# Patient Record
Sex: FEMALE | Race: WHITE | NOT HISPANIC OR LATINO | Employment: UNEMPLOYED | URBAN - METROPOLITAN AREA
[De-identification: names, ages, dates, MRNs, and addresses within clinical notes are randomized per-mention and may not be internally consistent; named-entity substitution may affect disease eponyms.]

---

## 2017-01-16 ENCOUNTER — COMMUNICATION - HEALTHEAST (OUTPATIENT)
Dept: FAMILY MEDICINE | Facility: CLINIC | Age: 22
End: 2017-01-16

## 2017-01-16 DIAGNOSIS — F32.A DEPRESSION: ICD-10-CM

## 2017-01-30 ENCOUNTER — OFFICE VISIT - HEALTHEAST (OUTPATIENT)
Dept: FAMILY MEDICINE | Facility: CLINIC | Age: 22
End: 2017-01-30

## 2017-01-30 DIAGNOSIS — F32.5 MAJOR DEPRESSION IN FULL REMISSION (H): ICD-10-CM

## 2017-01-30 DIAGNOSIS — R39.15 URINARY URGENCY: ICD-10-CM

## 2017-01-30 ASSESSMENT — MIFFLIN-ST. JEOR: SCORE: 1932

## 2017-02-02 ENCOUNTER — COMMUNICATION - HEALTHEAST (OUTPATIENT)
Dept: FAMILY MEDICINE | Facility: CLINIC | Age: 22
End: 2017-02-02

## 2017-03-20 ENCOUNTER — COMMUNICATION - HEALTHEAST (OUTPATIENT)
Dept: FAMILY MEDICINE | Facility: CLINIC | Age: 22
End: 2017-03-20

## 2017-03-20 DIAGNOSIS — F32.5 MAJOR DEPRESSION IN FULL REMISSION (H): ICD-10-CM

## 2017-03-23 ENCOUNTER — COMMUNICATION - HEALTHEAST (OUTPATIENT)
Dept: FAMILY MEDICINE | Facility: CLINIC | Age: 22
End: 2017-03-23

## 2017-03-24 ENCOUNTER — OFFICE VISIT - HEALTHEAST (OUTPATIENT)
Dept: FAMILY MEDICINE | Facility: CLINIC | Age: 22
End: 2017-03-24

## 2017-03-24 DIAGNOSIS — Z71.84 TRAVEL ADVICE ENCOUNTER: ICD-10-CM

## 2017-03-28 ENCOUNTER — COMMUNICATION - HEALTHEAST (OUTPATIENT)
Dept: FAMILY MEDICINE | Facility: CLINIC | Age: 22
End: 2017-03-28

## 2017-03-30 ENCOUNTER — RECORDS - HEALTHEAST (OUTPATIENT)
Dept: ADMINISTRATIVE | Facility: OTHER | Age: 22
End: 2017-03-30

## 2017-04-01 ENCOUNTER — RECORDS - HEALTHEAST (OUTPATIENT)
Dept: ADMINISTRATIVE | Facility: OTHER | Age: 22
End: 2017-04-01

## 2017-06-22 ENCOUNTER — COMMUNICATION - HEALTHEAST (OUTPATIENT)
Dept: FAMILY MEDICINE | Facility: CLINIC | Age: 22
End: 2017-06-22

## 2017-06-22 DIAGNOSIS — F32.5 MAJOR DEPRESSION IN FULL REMISSION (H): ICD-10-CM

## 2017-06-25 ENCOUNTER — COMMUNICATION - HEALTHEAST (OUTPATIENT)
Dept: FAMILY MEDICINE | Facility: CLINIC | Age: 22
End: 2017-06-25

## 2018-03-13 ENCOUNTER — OFFICE VISIT - HEALTHEAST (OUTPATIENT)
Dept: FAMILY MEDICINE | Facility: CLINIC | Age: 23
End: 2018-03-13

## 2018-03-13 DIAGNOSIS — F33.9 MAJOR DEPRESSION, RECURRENT (H): ICD-10-CM

## 2018-03-13 DIAGNOSIS — F40.243 FEAR OF FLYING: ICD-10-CM

## 2018-03-27 ENCOUNTER — COMMUNICATION - HEALTHEAST (OUTPATIENT)
Dept: FAMILY MEDICINE | Facility: CLINIC | Age: 23
End: 2018-03-27

## 2018-03-29 ENCOUNTER — COMMUNICATION - HEALTHEAST (OUTPATIENT)
Dept: FAMILY MEDICINE | Facility: CLINIC | Age: 23
End: 2018-03-29

## 2018-06-25 ENCOUNTER — COMMUNICATION - HEALTHEAST (OUTPATIENT)
Dept: FAMILY MEDICINE | Facility: CLINIC | Age: 23
End: 2018-06-25

## 2018-06-25 DIAGNOSIS — F32.A DEPRESSION: ICD-10-CM

## 2018-09-13 ENCOUNTER — COMMUNICATION - HEALTHEAST (OUTPATIENT)
Dept: FAMILY MEDICINE | Facility: CLINIC | Age: 23
End: 2018-09-13

## 2019-03-27 ENCOUNTER — COMMUNICATION - HEALTHEAST (OUTPATIENT)
Dept: FAMILY MEDICINE | Facility: CLINIC | Age: 24
End: 2019-03-27

## 2019-03-27 DIAGNOSIS — F32.A DEPRESSION: ICD-10-CM

## 2019-05-23 ENCOUNTER — COMMUNICATION - HEALTHEAST (OUTPATIENT)
Dept: FAMILY MEDICINE | Facility: CLINIC | Age: 24
End: 2019-05-23

## 2019-05-23 DIAGNOSIS — F32.A DEPRESSION: ICD-10-CM

## 2019-08-30 ENCOUNTER — COMMUNICATION - HEALTHEAST (OUTPATIENT)
Dept: INTERNAL MEDICINE | Facility: CLINIC | Age: 24
End: 2019-08-30

## 2019-08-30 ENCOUNTER — OFFICE VISIT - HEALTHEAST (OUTPATIENT)
Dept: INTERNAL MEDICINE | Facility: CLINIC | Age: 24
End: 2019-08-30

## 2019-08-30 DIAGNOSIS — Z00.00 ROUTINE HEALTH MAINTENANCE: ICD-10-CM

## 2019-08-30 DIAGNOSIS — E66.09 CLASS 2 OBESITY DUE TO EXCESS CALORIES WITHOUT SERIOUS COMORBIDITY WITH BODY MASS INDEX (BMI) OF 37.0 TO 37.9 IN ADULT: ICD-10-CM

## 2019-08-30 DIAGNOSIS — E66.812 CLASS 2 OBESITY DUE TO EXCESS CALORIES WITHOUT SERIOUS COMORBIDITY WITH BODY MASS INDEX (BMI) OF 37.0 TO 37.9 IN ADULT: ICD-10-CM

## 2019-08-30 DIAGNOSIS — F41.9 ANXIETY: ICD-10-CM

## 2019-08-30 DIAGNOSIS — F32.A DEPRESSION, UNSPECIFIED DEPRESSION TYPE: ICD-10-CM

## 2019-08-30 LAB
ALBUMIN SERPL-MCNC: 4 G/DL (ref 3.5–5)
ALP SERPL-CCNC: 57 U/L (ref 45–120)
ALT SERPL W P-5'-P-CCNC: 19 U/L (ref 0–45)
ANION GAP SERPL CALCULATED.3IONS-SCNC: 10 MMOL/L (ref 5–18)
AST SERPL W P-5'-P-CCNC: 18 U/L (ref 0–40)
BASOPHILS # BLD AUTO: 0.1 THOU/UL (ref 0–0.2)
BASOPHILS NFR BLD AUTO: 1 % (ref 0–2)
BILIRUB SERPL-MCNC: 0.2 MG/DL (ref 0–1)
BUN SERPL-MCNC: 12 MG/DL (ref 8–22)
CALCIUM SERPL-MCNC: 9.6 MG/DL (ref 8.5–10.5)
CHLORIDE BLD-SCNC: 105 MMOL/L (ref 98–107)
CHOLEST SERPL-MCNC: 191 MG/DL
CO2 SERPL-SCNC: 25 MMOL/L (ref 22–31)
CREAT SERPL-MCNC: 0.9 MG/DL (ref 0.6–1.1)
EOSINOPHIL # BLD AUTO: 0.2 THOU/UL (ref 0–0.4)
EOSINOPHIL NFR BLD AUTO: 2 % (ref 0–6)
ERYTHROCYTE [DISTWIDTH] IN BLOOD BY AUTOMATED COUNT: 12 % (ref 11–14.5)
FASTING STATUS PATIENT QL REPORTED: YES
GFR SERPL CREATININE-BSD FRML MDRD: >60 ML/MIN/1.73M2
GLUCOSE BLD-MCNC: 102 MG/DL (ref 70–125)
HCT VFR BLD AUTO: 37 % (ref 35–47)
HDLC SERPL-MCNC: 47 MG/DL
HGB BLD-MCNC: 12.4 G/DL (ref 12–16)
LDLC SERPL CALC-MCNC: 109 MG/DL
LYMPHOCYTES # BLD AUTO: 3.4 THOU/UL (ref 0.8–4.4)
LYMPHOCYTES NFR BLD AUTO: 37 % (ref 20–40)
MCH RBC QN AUTO: 26.2 PG (ref 27–34)
MCHC RBC AUTO-ENTMCNC: 33.5 G/DL (ref 32–36)
MCV RBC AUTO: 78 FL (ref 80–100)
MONOCYTES # BLD AUTO: 0.7 THOU/UL (ref 0–0.9)
MONOCYTES NFR BLD AUTO: 8 % (ref 2–10)
NEUTROPHILS # BLD AUTO: 4.8 THOU/UL (ref 2–7.7)
NEUTROPHILS NFR BLD AUTO: 53 % (ref 50–70)
PLATELET # BLD AUTO: 278 THOU/UL (ref 140–440)
PMV BLD AUTO: 8.7 FL (ref 7–10)
POTASSIUM BLD-SCNC: 4.2 MMOL/L (ref 3.5–5)
PROT SERPL-MCNC: 7.8 G/DL (ref 6–8)
RBC # BLD AUTO: 4.72 MILL/UL (ref 3.8–5.4)
SODIUM SERPL-SCNC: 140 MMOL/L (ref 136–145)
TRIGL SERPL-MCNC: 177 MG/DL
TSH SERPL DL<=0.005 MIU/L-ACNC: 4.15 UIU/ML (ref 0.3–5)
WBC: 9.2 THOU/UL (ref 4–11)

## 2019-08-30 ASSESSMENT — MIFFLIN-ST. JEOR: SCORE: 1968.05

## 2019-09-24 ENCOUNTER — COMMUNICATION - HEALTHEAST (OUTPATIENT)
Dept: INTERNAL MEDICINE | Facility: CLINIC | Age: 24
End: 2019-09-24

## 2019-09-24 DIAGNOSIS — F32.A DEPRESSION, UNSPECIFIED DEPRESSION TYPE: ICD-10-CM

## 2019-09-24 DIAGNOSIS — F41.9 ANXIETY: ICD-10-CM

## 2020-12-14 ENCOUNTER — OFFICE VISIT - HEALTHEAST (OUTPATIENT)
Dept: FAMILY MEDICINE | Facility: CLINIC | Age: 25
End: 2020-12-14

## 2020-12-14 DIAGNOSIS — Z11.3 ROUTINE SCREENING FOR STI (SEXUALLY TRANSMITTED INFECTION): ICD-10-CM

## 2020-12-14 DIAGNOSIS — Z30.09 GENERAL COUNSELING FOR PRESCRIPTION OF ORAL CONTRACEPTIVES: ICD-10-CM

## 2020-12-14 DIAGNOSIS — Z00.00 ROUTINE GENERAL MEDICAL EXAMINATION AT A HEALTH CARE FACILITY: ICD-10-CM

## 2020-12-14 DIAGNOSIS — F41.9 ANXIETY: ICD-10-CM

## 2020-12-14 DIAGNOSIS — F32.1 MODERATE MAJOR DEPRESSION (H): ICD-10-CM

## 2020-12-14 DIAGNOSIS — Z12.4 SCREENING FOR CERVICAL CANCER: ICD-10-CM

## 2020-12-14 DIAGNOSIS — Z13.228 ENCOUNTER FOR SCREENING FOR OTHER METABOLIC DISORDERS: ICD-10-CM

## 2020-12-14 DIAGNOSIS — R12 HEARTBURN: ICD-10-CM

## 2020-12-14 LAB
FERRITIN SERPL-MCNC: 19 NG/ML (ref 10–130)
HGB BLD-MCNC: 13.5 G/DL (ref 12–16)
HIV 1+2 AB+HIV1 P24 AG SERPL QL IA: NEGATIVE
TSH SERPL DL<=0.005 MIU/L-ACNC: 1.5 UIU/ML (ref 0.3–5)

## 2020-12-14 ASSESSMENT — MIFFLIN-ST. JEOR: SCORE: 1867.7

## 2020-12-14 ASSESSMENT — PATIENT HEALTH QUESTIONNAIRE - PHQ9: SUM OF ALL RESPONSES TO PHQ QUESTIONS 1-9: 12

## 2020-12-15 ENCOUNTER — COMMUNICATION - HEALTHEAST (OUTPATIENT)
Dept: FAMILY MEDICINE | Facility: CLINIC | Age: 25
End: 2020-12-15

## 2020-12-15 DIAGNOSIS — F32.1 MODERATE MAJOR DEPRESSION (H): ICD-10-CM

## 2020-12-15 LAB
BKR LAB AP ABNORMAL BLEEDING: NO
BKR LAB AP BIRTH CONTROL/HORMONES: NORMAL
BKR LAB AP CERVICAL APPEARANCE: NORMAL
BKR LAB AP GYN ADEQUACY: NORMAL
BKR LAB AP GYN INTERPRETATION: NORMAL
BKR LAB AP HPV REFLEX: NORMAL
BKR LAB AP LMP: NORMAL
BKR LAB AP PATIENT STATUS: NORMAL
BKR LAB AP PREVIOUS ABNORMAL: NORMAL
BKR LAB AP PREVIOUS NORMAL: NORMAL
HCV AB SERPL QL IA: NEGATIVE
HIGH RISK?: NO
PATH REPORT.COMMENTS IMP SPEC: NORMAL
RESULT FLAG (HE HISTORICAL CONVERSION): NORMAL

## 2020-12-18 ENCOUNTER — COMMUNICATION - HEALTHEAST (OUTPATIENT)
Dept: FAMILY MEDICINE | Facility: CLINIC | Age: 25
End: 2020-12-18

## 2020-12-22 ENCOUNTER — COMMUNICATION - HEALTHEAST (OUTPATIENT)
Dept: INTERNAL MEDICINE | Facility: CLINIC | Age: 25
End: 2020-12-22

## 2020-12-22 DIAGNOSIS — F41.9 ANXIETY: ICD-10-CM

## 2020-12-22 DIAGNOSIS — F32.1 MODERATE MAJOR DEPRESSION (H): ICD-10-CM

## 2021-01-27 ENCOUNTER — COMMUNICATION - HEALTHEAST (OUTPATIENT)
Dept: FAMILY MEDICINE | Facility: CLINIC | Age: 26
End: 2021-01-27

## 2021-02-08 ENCOUNTER — COMMUNICATION - HEALTHEAST (OUTPATIENT)
Dept: FAMILY MEDICINE | Facility: CLINIC | Age: 26
End: 2021-02-08

## 2021-02-08 DIAGNOSIS — R12 HEARTBURN: ICD-10-CM

## 2021-02-09 ENCOUNTER — OFFICE VISIT - HEALTHEAST (OUTPATIENT)
Dept: FAMILY MEDICINE | Facility: CLINIC | Age: 26
End: 2021-02-09

## 2021-02-09 ENCOUNTER — COMMUNICATION - HEALTHEAST (OUTPATIENT)
Dept: FAMILY MEDICINE | Facility: CLINIC | Age: 26
End: 2021-02-09

## 2021-02-09 DIAGNOSIS — F32.1 MODERATE MAJOR DEPRESSION (H): ICD-10-CM

## 2021-02-09 DIAGNOSIS — R41.840 DISTURBED CONCENTRATION: ICD-10-CM

## 2021-02-09 DIAGNOSIS — R12 HEARTBURN: ICD-10-CM

## 2021-02-09 ASSESSMENT — PATIENT HEALTH QUESTIONNAIRE - PHQ9: SUM OF ALL RESPONSES TO PHQ QUESTIONS 1-9: 12

## 2021-03-03 ENCOUNTER — COMMUNICATION - HEALTHEAST (OUTPATIENT)
Dept: FAMILY MEDICINE | Facility: CLINIC | Age: 26
End: 2021-03-03

## 2021-03-15 ENCOUNTER — COMMUNICATION - HEALTHEAST (OUTPATIENT)
Dept: FAMILY MEDICINE | Facility: CLINIC | Age: 26
End: 2021-03-15

## 2021-03-15 DIAGNOSIS — F32.1 MODERATE MAJOR DEPRESSION (H): ICD-10-CM

## 2021-03-27 ENCOUNTER — COMMUNICATION - HEALTHEAST (OUTPATIENT)
Dept: SCHEDULING | Facility: CLINIC | Age: 26
End: 2021-03-27

## 2021-03-27 ENCOUNTER — OFFICE VISIT - HEALTHEAST (OUTPATIENT)
Dept: FAMILY MEDICINE | Facility: CLINIC | Age: 26
End: 2021-03-27

## 2021-03-27 DIAGNOSIS — N39.0 ACUTE UTI (URINARY TRACT INFECTION): ICD-10-CM

## 2021-04-29 ENCOUNTER — COMMUNICATION - HEALTHEAST (OUTPATIENT)
Dept: FAMILY MEDICINE | Facility: CLINIC | Age: 26
End: 2021-04-29

## 2021-04-29 ENCOUNTER — OFFICE VISIT - HEALTHEAST (OUTPATIENT)
Dept: FAMILY MEDICINE | Facility: CLINIC | Age: 26
End: 2021-04-29

## 2021-04-29 DIAGNOSIS — F90.0 ATTENTION DEFICIT HYPERACTIVITY DISORDER (ADHD), PREDOMINANTLY INATTENTIVE TYPE: ICD-10-CM

## 2021-04-29 DIAGNOSIS — R12 HEARTBURN: ICD-10-CM

## 2021-04-29 DIAGNOSIS — Z30.09 GENERAL COUNSELING FOR PRESCRIPTION OF ORAL CONTRACEPTIVES: ICD-10-CM

## 2021-04-29 DIAGNOSIS — F32.1 MODERATE MAJOR DEPRESSION (H): ICD-10-CM

## 2021-04-29 RX ORDER — DESOGESTREL AND ETHINYL ESTRADIOL 0.15-0.03
1 KIT ORAL DAILY
Qty: 84 TABLET | Refills: 3 | Status: SHIPPED | OUTPATIENT
Start: 2021-04-29 | End: 2022-04-07

## 2021-04-29 ASSESSMENT — PATIENT HEALTH QUESTIONNAIRE - PHQ9: SUM OF ALL RESPONSES TO PHQ QUESTIONS 1-9: 9

## 2021-05-07 ENCOUNTER — COMMUNICATION - HEALTHEAST (OUTPATIENT)
Dept: FAMILY MEDICINE | Facility: CLINIC | Age: 26
End: 2021-05-07

## 2021-05-07 DIAGNOSIS — F32.1 MODERATE MAJOR DEPRESSION (H): ICD-10-CM

## 2021-05-07 RX ORDER — ESCITALOPRAM OXALATE 20 MG/1
TABLET ORAL
Qty: 90 TABLET | Refills: 3 | Status: SHIPPED | OUTPATIENT
Start: 2021-05-07 | End: 2022-05-05

## 2021-05-07 RX ORDER — BUPROPION HYDROCHLORIDE 300 MG/1
TABLET ORAL
Qty: 90 TABLET | Refills: 3 | Status: SHIPPED | OUTPATIENT
Start: 2021-05-07 | End: 2022-05-05

## 2021-05-13 ENCOUNTER — COMMUNICATION - HEALTHEAST (OUTPATIENT)
Dept: ADMINISTRATIVE | Facility: CLINIC | Age: 26
End: 2021-05-13

## 2021-05-27 ASSESSMENT — PATIENT HEALTH QUESTIONNAIRE - PHQ9
SUM OF ALL RESPONSES TO PHQ QUESTIONS 1-9: 12
SUM OF ALL RESPONSES TO PHQ QUESTIONS 1-9: 12
SUM OF ALL RESPONSES TO PHQ QUESTIONS 1-9: 9

## 2021-05-27 NOTE — TELEPHONE ENCOUNTER
RN cannot approve Refill Request    RN can NOT refill this medication PCP messaged that patient is overdue for Office Visit.       Brandi De Dios, [facility] Triage/Med Refill 3/27/2019    Requested Prescriptions   Pending Prescriptions Disp Refills     buPROPion (WELLBUTRIN XL) 300 MG 24 hr tablet [Pharmacy Med Name: BUPROPION HCL  MG TABLET] 90 tablet 2     Sig: TAKE 1 TABLET (300 MG TOTAL) BY MOUTH DAILY.    Tricyclics/Misc Antidepressant/Antianxiety Meds Refill Protocol Failed - 3/27/2019 10:30 AM       Failed - PCP or prescribing provider visit in last year    Last office visit with prescriber/PCP: 3/13/2018 Arielle Francisco MD OR same dept: Visit date not found OR same specialty: 3/13/2018 Arielle Francisco MD  Last physical: Visit date not found Last MTM visit: Visit date not found   Next visit within 3 mo: Visit date not found  Next physical within 3 mo: Visit date not found  Prescriber OR PCP: Arielle Francisco MD  Last diagnosis associated with med order: 1. Depression  - buPROPion (WELLBUTRIN XL) 300 MG 24 hr tablet [Pharmacy Med Name: BUPROPION HCL  MG TABLET]; TAKE 1 TABLET (300 MG TOTAL) BY MOUTH DAILY.  Dispense: 90 tablet; Refill: 2    If protocol passes may refill for 12 months if within 3 months of last provider visit (or a total of 15 months).

## 2021-05-27 NOTE — TELEPHONE ENCOUNTER
Left message to call back for: Patient  Information to relay to patient:  Please let pt know that Dr. Francisco sent in a refill of her bupropion but she is due for an office visit before further refills.  Please assist in scheduling a med check for pt.  Thanks!

## 2021-05-30 VITALS — HEIGHT: 70 IN | WEIGHT: 248 LBS | BODY MASS INDEX: 35.5 KG/M2

## 2021-05-30 VITALS — WEIGHT: 248 LBS | BODY MASS INDEX: 35.79 KG/M2

## 2021-05-31 NOTE — PROGRESS NOTES
Office Visit - New Patient   Liliana Henley   24 y.o.  female    Date of visit: 8/30/2019  Physician: Jose Francisco Sibley MD     Assessment and Plan     1.  24-year-old woman establishing primary care at [facility] internal medicine.  Historically has been quite healthy.  Issues addressed today are anxiety, depression, obesity, mildly elevated blood pressure, and preventive health.    She is fasting this morning, so we will check metabolic panel, CBC, TSH, and lipid profile.  She declines HIV screening.  Will be referring her to gynecology for routine care.  Vaccinations appear to be up-to-date.    Going issue issue    2.  Anxiety and depression that dates back to her teenager years.  She has been on Wellbutrin at doses as high as 300 mg a day, which she found slightly helpful, but not sufficiently so.  She occasionally has more severe anxiety, and has history of at least one panic attack.  She gets anxiety when flying, for which she will use Xanax.  Alcohol consumption is practically.  She says that depression has been a bit worse in recent months.  Her PHQ 9 scores is 18 with significant positives on interest and pleasure, feeling depressed, overeating, and trouble concentrating.    She is interested in trying a different medication.  She never previously had any of the SSRIs.  I took bupropion off of her medication list.  We will try escitalopram (also known to the brand name Lexapro) at a starting dose of 10 mg daily.  She knows that it may take weeks to even a couple months to assess its effect.  Next dosage level would be 20 mg a day.  I would like to see her back in the clinic in about 6 to 8 weeks to assess effect, consider dose changes.  She will let me know if she has any severe fluctuations in mood, because that may mean that we need to stop Lexapro and try something else.    She feels that she is functioning reasonably well at work.  She does not does not feel like she is in any danger.  There do  not appear to be any problems with sleep apnea or other severe sleep disturbance.    3.  Obesity with BMI of 37.  She was a high school .  Graduate from college at about 200 pounds.  Now she is about 57 pounds above that.  Not getting regular exercise.  Overeating may be related to issue #2 above.    She is committed to starting an exercise program again, and being more careful with diet.  We will recheck a metabolic parameters.  There is no family history of diabetes.    4.  Mildly elevated blood pressure.  Today's level of 126/86 would be considered mildly elevated.  Would not start medication at this point, but rather focus on lifestyle improvement, especially weight loss.  I told her that  target normal blood pressure is 120/80, measured at rest.  I would suggest that she start monitoring her own blood pressures and keeping a log book.    5.  Other preventive care.  Referral entered for routine GYN care.  She reports normal periods.  Vaccinations appear up-to-date.  No close family history of breast cancer or colon cancer.         Chief Complaint   Chief Complaint   Patient presents with     Hypertension     BP check     Anxiety     Anxiety and depression worse lately, off all meds due to insurance        History of Present Illness   This 24 y.o. old comes for evaluation of anxiety and depression, would like to get back on medication, also has concerns about blood pressure and weight    Went off parent insurance this year    Anxiety  Depression in early 20's  Anxiety more recently, not situational.  Was on Wellbutrin-- ran out as of 1 month ago    No SSRI's yet  Would like to try  Sleeping is OK  No JHONATAN  Anxiety 3 months ago had a panic attack.  Depression worse past few months  Work as -- maybe a little less focus  Fear of flying - She has panic when she flies and takes xanax which works well for her.  Has never seen a  specialist.    Alcohol is practically zero  Single, not in  relationship  Lives in own apartment    Obesity  Wt Readings from Last 3 Encounters:   08/30/19 (!) 257 lb (116.6 kg)   03/13/18 (!) 253 lb 8 oz (115 kg)   03/24/17 (!) 248 lb (112.5 kg)   [place] in [place]  Graduated from college around 200  Not exercising these days  Desk job  Restaurant food    BP  BP Readings from Last 3 Encounters:   08/30/19 126/86   03/13/18 128/70   03/24/17 128/80       Immunization History   Administered Date(s) Administered     DT (pediatric) 05/17/2005     DTaP, historic 1993, 1993, 1993, 09/07/1994, 11/13/1997     HPV Quadrivalent 06/14/2007, 01/02/2008, 05/28/2008     Hep A, historic 07/29/2009, 11/01/2010     Hep B, historic 1993, 1993, 1993     HiB, historic,unspecified 1993, 1993, 1993, 05/31/1994     IPV 1993, 1993, 09/07/1994     Influenza, inj, historic,unspecified 02/12/2007, 01/02/2008, 11/01/2010     MMR 02/11/1994, 05/17/2005     Meningococcal MCV4P 06/14/2007     Td,adult,historic,unspecified 05/17/2005     Tdap 11/01/2010     Typhoid, Inj, Inactive 03/24/2017       Review of Systems symptom inventory: positives marked with (+)    Constitutional: No fevers, weight loss, or appetite disturbance  Eyes: No visual disturbance or loss of acuity, eye pain or irritation  Ears, nose, mouth, throat: No change in hearing, nasal congestion, oral or throat symptoms  Cardiovascular: No chest pain, palpitation, syncope or presyncope  Respiratory: No shortness of breath, cough, or wheeze  Gastrointestinal: No abdominal pain, nausea, vomiting, or bowel change/disturbance  Musculoskeletal: No joint pain or swelling, back pain or deformity, limitation of motion  Integumentary (skin/breast): No skin eruptions or concerning lesions  Neurological: No cognitive difficulties, problems with movement or coordination, gait or balance, numbness or loss of sensation  Psychiatric:   + symptoms of depression, anxiety, or thought  "disturbance  Endocrine: No involuntary weight gain or loss, heat or cold intolerance, polyuria or polydipsia  Hematologic/lymphatic: No abnormal bleeding/clotting/bruising, no swelling or tissue masses  Allergic/immunologic: No allergic symptoms or infections suggesting immunodeficiency     Review of Systems: A comprehensive review of systems was negative except as noted.     Medications and Allergies   Current Outpatient Medications   Medication Sig Dispense Refill     escitalopram oxalate (LEXAPRO) 10 MG tablet Take 1 tablet (10 mg total) by mouth daily. 30 tablet 2     No current facility-administered medications for this visit.      Allergies   Allergen Reactions     Luna Seed         Family and Social History   Family History   Problem Relation Age of Onset     No Medical Problems Mother      Hypertension Father      Aortic aneurysm Father      Hypertension Sister      No Medical Problems Brother         Social History     Tobacco Use     Smoking status: Never Smoker     Smokeless tobacco: Never Used   Substance Use Topics     Alcohol use: Yes     Alcohol/week: 0.0 - 0.6 oz     Drug use: No        Physical Exam   General Appearance: Very pleasant, significantly overweight.  Affect seems normal    /86 (Patient Site: Right Arm, Patient Position: Sitting, Cuff Size: Adult Large)   Pulse 78   Ht 5' 9.5\" (1.765 m)   Wt (!) 257 lb (116.6 kg)   SpO2 99%   BMI 37.41 kg/m      Positives marked with (+)  General: Alert, in no distress  Skin: No significant lesion seen.  Eyes/nose/throat: Eyes without scleral icterus, eye movements normal, pupils equal and reactive, oropharynx clear, ears with normal TM's  MSK: Neck with good ROM  Lymphatic: Neck without adenopathy or masses  Endocrine: Thyroid with no nodules to palpation  Pulm: Lungs clear to auscultation bilaterally  Cardiac: Heart with regular rate and rhythm, no murmur or gallop  GI: Abdomen soft, nontender. No palpable enlargement of liver or " spleen  MSK: Extremities no tenderness or edema  Neuro: Moves all extremities, without focal weakness  Psych: Alert mental status. Normal affect and speech        Additional Information        Jose Francisco Sibley MD  Internal Medicine

## 2021-05-31 NOTE — PATIENT INSTRUCTIONS - HE
1. 24-year-old woman establishing primary care at [facility] internal medicine.  Historically has been quite healthy.  Issues addressed today are anxiety, depression, obesity, mildly elevated blood pressure, and preventive health.    She is fasting this morning, so we will check metabolic panel, CBC, TSH, and lipid profile.  She declines HIV screening.  Will be referring her to gynecology for routine care.  Vaccinations appear to be up-to-date.    Going issue issue    2.  Anxiety and depression that dates back to her teenager years.  She has been on Wellbutrin at doses as high as 300 mg a day, which she found slightly helpful, but not sufficiently so.  She occasionally has more severe anxiety, and has history of at least one panic attack.  She gets anxiety when flying, for which she will use Xanax.  Alcohol consumption is practically.  She says that depression has been a bit worse in recent months.  Her PHQ 9 scores is 18 with significant positives on interest and pleasure, feeling depressed, overeating, and trouble concentrating.    She is interested in trying a different medication.  She never previously had any of the SSRIs.  I took bupropion off of her medication list.  We will try escitalopram (also known to the brand name Lexapro) at a starting dose of 10 mg daily.  She knows that it may take weeks to even a couple months to assess its effect.  Next dosage level would be 20 mg a day.  I would like to see her back in the clinic in about 6 to 8 weeks to assess effect, consider dose changes.  She will let me know if she has any severe fluctuations in mood, because that may mean that we need to stop Lexapro and try something else.    She feels that she is functioning reasonably well at work.  She does not does not feel like she is in any danger.  There do not appear to be any problems with sleep apnea or other severe sleep disturbance.    3.  Obesity with BMI of 37.  She was a high school basketball  player.  Graduate from college at about 200 pounds.  Now she is about 57 pounds above that.  Not getting regular exercise.  Overeating may be related to issue #2 above.    She is committed to starting an exercise program again, and being more careful with diet.  We will recheck a metabolic parameters.  There is no family history of diabetes.    4.  Mildly elevated blood pressure.  Today's level of 126/86 would be considered mildly elevated.  Would not start medication at this point, but rather focus on lifestyle improvement, especially weight loss.  I told her that  target normal blood pressure is 120/80, measured at rest.  I would suggest that she start monitoring her own blood pressures and keeping a log book.    5.  Other preventive care.  Referral entered for routine GYN care.  She reports normal periods.  Vaccinations appear up-to-date.  No close family history of breast cancer or colon cancer.

## 2021-06-01 VITALS — BODY MASS INDEX: 36.58 KG/M2 | WEIGHT: 253.5 LBS

## 2021-06-01 NOTE — TELEPHONE ENCOUNTER
Refill Approved    Rx renewed per Medication Renewal Policy. Medication was last renewed on 8/30/19.    Brandi De Dios, [facility] Triage/Med Refill 9/25/2019     Requested Prescriptions   Pending Prescriptions Disp Refills     escitalopram oxalate (LEXAPRO) 10 MG tablet 90 tablet 0     Sig: Take 1 tablet (10 mg total) by mouth daily.       SSRI Refill Protocol  Passed - 9/24/2019  3:07 PM        Passed - PCP or prescribing provider visit in last year     Last office visit with prescriber/PCP: 8/30/2019 Jose Francisco Sibley MD OR same dept: 8/30/2019 Jose Francisco Sibley MD OR same specialty: 8/30/2019 Jose Francisco Sibley MD  Last physical: Visit date not found Last MTM visit: Visit date not found   Next visit within 3 mo: Visit date not found  Next physical within 3 mo: Visit date not found  Prescriber OR PCP: Jose Francisco Sibley MD  Last diagnosis associated with med order: 1. Anxiety  - escitalopram oxalate (LEXAPRO) 10 MG tablet; Take 1 tablet (10 mg total) by mouth daily.  Dispense: 90 tablet; Refill: 0    2. Depression, unspecified depression type  - escitalopram oxalate (LEXAPRO) 10 MG tablet; Take 1 tablet (10 mg total) by mouth daily.  Dispense: 90 tablet; Refill: 0    If protocol passes may refill for 12 months if within 3 months of last provider visit (or a total of 15 months).

## 2021-06-03 VITALS — HEIGHT: 70 IN | WEIGHT: 257 LBS | BODY MASS INDEX: 36.79 KG/M2

## 2021-06-05 VITALS
HEIGHT: 70 IN | RESPIRATION RATE: 16 BRPM | WEIGHT: 234 LBS | BODY MASS INDEX: 33.5 KG/M2 | SYSTOLIC BLOOD PRESSURE: 120 MMHG | TEMPERATURE: 98.8 F | HEART RATE: 88 BPM | DIASTOLIC BLOOD PRESSURE: 78 MMHG

## 2021-06-07 ENCOUNTER — OFFICE VISIT - HEALTHEAST (OUTPATIENT)
Dept: FAMILY MEDICINE | Facility: CLINIC | Age: 26
End: 2021-06-07

## 2021-06-07 DIAGNOSIS — R12 HEARTBURN: ICD-10-CM

## 2021-06-07 DIAGNOSIS — F90.0 ATTENTION DEFICIT HYPERACTIVITY DISORDER (ADHD), PREDOMINANTLY INATTENTIVE TYPE: ICD-10-CM

## 2021-06-07 LAB
AMPHETAMINES UR QL SCN: NORMAL
BARBITURATES UR QL: NORMAL
BENZODIAZ UR QL: NORMAL
CANNABINOIDS UR QL SCN: NORMAL
COCAINE UR QL: NORMAL
CREAT UR-MCNC: 69 MG/DL
METHADONE UR QL SCN: NORMAL
OPIATES UR QL SCN: NORMAL
OXYCODONE UR QL: NORMAL
PCP UR QL SCN: NORMAL

## 2021-06-07 RX ORDER — DEXTROAMPHETAMINE SACCHARATE, AMPHETAMINE ASPARTATE MONOHYDRATE, DEXTROAMPHETAMINE SULFATE AND AMPHETAMINE SULFATE 2.5; 2.5; 2.5; 2.5 MG/1; MG/1; MG/1; MG/1
CAPSULE, EXTENDED RELEASE ORAL
Qty: 90 CAPSULE | Refills: 0 | Status: SHIPPED | OUTPATIENT
Start: 2021-06-07 | End: 2021-09-02

## 2021-06-07 RX ORDER — DEXTROAMPHETAMINE SACCHARATE, AMPHETAMINE ASPARTATE MONOHYDRATE, DEXTROAMPHETAMINE SULFATE AND AMPHETAMINE SULFATE 7.5; 7.5; 7.5; 7.5 MG/1; MG/1; MG/1; MG/1
30 CAPSULE, EXTENDED RELEASE ORAL DAILY
Qty: 90 CAPSULE | Refills: 0 | Status: SHIPPED | OUTPATIENT
Start: 2021-06-07 | End: 2021-09-02

## 2021-06-07 ASSESSMENT — PATIENT HEALTH QUESTIONNAIRE - PHQ9: SUM OF ALL RESPONSES TO PHQ QUESTIONS 1-9: 4

## 2021-06-07 ASSESSMENT — MIFFLIN-ST. JEOR: SCORE: 1753.84

## 2021-06-08 NOTE — PROGRESS NOTES
"SUBJECTIVE: Liliana Henley is a 22 y.o. female with:  Chief Complaint   Patient presents with     Depression     pt stop Depo - August 2016     Medication Management     wellbutrin    1) Depression - She is taking Wellbutrin 300 mg daily in the am.  No problems with sleep.  She switched jobs. 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  She likes her job much better.  She recently broke up with long term boyfriend and feels she handled the situation much better than break-ups in the past  She also feels the Wellbutrin helps with her concentration.  She is not taking any supplements.  Wonders if she can wean off medicine.    2) Increased urination - She has some urgency but no incontinence.  She drinks coffee- 8 oz in am and then another cup during day.  No artificial sweeteners.  She drinks water otherwise.  She had normal BMP/ urine analysis at visit in July.  She thinks the urinary issues coincided with increasing dose Wellbutrin.    3) She stopped Depo in August and has not got her period yet.  She has not been sexually active.    OBJECTIVE:   Visit Vitals     /60     Pulse 60     Ht 5' 9.8\" (1.773 m)     Wt (!) 248 lb (112.5 kg)     Breastfeeding No     BMI 35.79 kg/m2    no distress  Psych Exam:  Mood: upbeat and pleasant  Affect: normal   Behavior: appropriate  ThoughtContent: logical  Judgement: appropriate  Insight: normal    PHQ9: 2    Liliana was seen today for depression and medication management.    Diagnoses and all orders for this visit:    Major depression in full remission - We can try weaning but recommend she consider taking supplements/ work on exercise and diet to support her neurotransmitter production.  -     buPROPion (WELLBUTRIN XL) 150 MG 24 hr tablet; Take 1 tablet (150 mg total) by mouth daily.  -     Vitamin D, Total (25-Hydroxy)    Urinary urgency- I think this is side effect from Wellbutrin.     Cut down on Wellbutrin to 150 mg daily then update me by " My Chart message in 2 weeks.  I would continue for 8 weeks then stop or rx 75 mg for a few more weeks.    ProThera VitaPrime Iron-free multivitamins with minerals 1 twice daily    Marine Fish oil - one capsule daily      Arielle Francisco

## 2021-06-09 NOTE — PROGRESS NOTES
Assessment:      No contraindications to travel.    Flight anxiety        Plan:      Issues discussed: insect-borne illnesses, malaria, traveler's diarrhea and flight anxiety. Will use Xanax for this. We discussed how to use this .  Immunizations recommended: Typhoid (parenteral).She is UTD on Hep A and Hep B  Malaria prophylaxis: malarone, daily dose starting 1-2 days before entering endemic area, ending 7 days after leaving area  Traveler's diarrhea prophylaxis: ciprofloxacin.  Total duration of visit: 15 Minutes. Total time spent on education, counseling, coordination of care: 15 Minutes.     Subjective:      Liliana Henley is a 22 y.o. female who presents for travel consultation.  Planned departure date: 4-5-17           Planned return date: 4-17-17  Countries of travel: [facility]  Areas in country: [place]    Accommodations: private home  Purpose of travel: vacation  Prior travel out of [place]: yes  Currently ill / Fever: no  History of liver or kidney disease: no    Data Review:   CDC web site reviewed for recommended immunizations and cautions  The following portions of the patient's history were reviewed and updated as appropriate: allergies, current medications, past family history, past medical history, past social history, past surgical history and problem list.    Review of Systems  Behavioral/Psych: positive for anxiety related to flight       Objective:      No exam performed today, NA.

## 2021-06-11 ENCOUNTER — OFFICE VISIT - HEALTHEAST (OUTPATIENT)
Dept: FAMILY MEDICINE | Facility: CLINIC | Age: 26
End: 2021-06-11

## 2021-06-11 DIAGNOSIS — R30.0 DIFFICULT OR PAINFUL URINATION: ICD-10-CM

## 2021-06-13 NOTE — PROGRESS NOTES
FEMALE ADULT PREVENTIVE EXAM    CHIEF COMPLAINT:  Female preventive exam.    SUBJECTIVE:  Liliana Henley is a 25 y.o. female who presents for her routine physical exam.    Patient would like to address the following concerns today:   1) Interested in birth control. She started dating and has a new partner.  Using condoms now.  Used Depo Provera in past but would like to use OCP.  No smoking/ migraines or h/o clotting disorder.  2) Depression - Mood has been down.  Having trouble focusing at work.  Taking citalopram 10 mg daily and her anxiety is better.  In the past she used Wellbutrin but stopped as did not address her anxiety.    3) She has been taking omeprazole for her heartburn.  Taking the med on regular basis.        GYNE HISTORY  Menses: regular  Sexually Active: yes  Contraception: condoms  Last Pap: 2013 normal  Abnormal Pap: no        She  has no past medical history on file.    Lab Results   Component Value Date    WBC 9.2 08/30/2019    HGB 12.4 08/30/2019    HCT 37.0 08/30/2019    MCV 78 (L) 08/30/2019     08/30/2019     08/30/2019    K 4.2 08/30/2019    BUN 12 08/30/2019     Lab Results   Component Value Date    CHOL 191 08/30/2019    HDL 47 (L) 08/30/2019    LDLCALC 109 08/30/2019    TRIG 177 (H) 08/30/2019     Lab Results   Component Value Date    TSH 4.15 08/30/2019     BP Readings from Last 3 Encounters:   12/14/20 120/78   08/30/19 126/86   03/13/18 128/70       Surgeries:  No past surgical history on file.    Family History:  Her family history includes Aortic aneurysm in her father; Hypertension in her father and sister; No Medical Problems in her brother and mother.    Social History:  She  reports that she has never smoked. She has never used smokeless tobacco. She reports current alcohol use. She reports that she does not use drugs. Single.  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      Medications:    Current Outpatient Medications:      escitalopram oxalate  (LEXAPRO) 10 MG tablet, Take 1 tablet (10 mg total) by mouth daily., Disp: 90 tablet, Rfl: 3  HELD MEDICATIONS: None.    Allergies:  No latex allergies.  Allergies   Allergen Reactions     Sunflower Seed             RISK BEHAVIOR & HEALTH HABITS  Regular Exercise: yes.  Balanced diet: yes.  Seat Belt Use: yes  Calcium/Vitamin D: no.    Dental Care: YES    REVIEW OF SYSTEMS:  Complete head to toe review of systems is otherwise negative except as above.    OBJECTIVE:  VITAL SIGNS:    Vitals:    12/14/20 1124   BP: 120/78   Pulse: 88   Resp: 16   Temp: 98.8  F (37.1  C)     GENERAL:  Patient alert, in no acute distress.  EYES: PERRLA. Extraoccular movements intact, pupils equal, reactive to light and accommodation.  Normal conjunctiva and lids.    ENT:  Hearing grossly normal.  Normal appearance to ears and nose.  Bilateral TM s, external canals, oropharynx normal. Normal lips, gums and teeth.    NECK:  Supple, without thyromegaly or mass.  RESP:  Clear to auscultation without crackles, wheezes or distress.  Normal respiratory effort.   CV:  Regular rate and rhythm without murmurs, rubs or gallops.  Normal pedal pulses.  No varicosities or edema.  ABDOMEN:  Soft, non-tender, without hepatosplenomegaly, masses, or hernias.   BREASTS:  Nontender, without masses, nipple discharge, erythema, or axillary adenopathy.    :    External genitalia:  Normal without lesions.  Urethra: Normal appearing  Vagina: Normal without discharge  Cervix: Pink.  No CMT.  Uterus:  Nontender, mobile, without masses  Ovaries: Normal without masses  LYMPHATIC: Normal palpation of neck.  No lymphadenopathy.  No bruising.  NEURO:  CN II-XII intact, motor & sensory function all intact.  DTR and reflexes normal.  PSYCHIATRIC:  Alert & oriented with normal mood and affect.  Good judgment and insight.  SKIN:  Normal inspection and palpation.  MUSCULOSKELETAL: Normal gait and station.  - Spine / Ribs / Pelvis: Normal inspection, ROM, stability and  strength: Spine, Head, Neck, Upper and Lower Extremities.    PHQ-9 Total Score: 12 (12/14/2020 11:27 AM)        Liliana was seen today for annual exam, depression, anxiety, heartburn and medication refill.    Diagnoses and all orders for this visit:    Routine general medical examination at a health care facility  -     Td, Preservative Free (green label)  -     Influenza, Seasonal Quad, PF =/> 6months      General counseling for prescription of oral contraceptives- Discussed Sunday start.  Use back-up for first month.  -     desogestreL-ethinyl estradioL (APRI) 0.15-0.03 mg per tablet; Take 1 tablet by mouth daily.    Moderate major depression (H)- Will add in Wellbutrin. F/U with virtual visit in 1 month.  -     buPROPion (WELLBUTRIN XL) 150 MG 24 hr tablet; Take 1 tablet (150 mg total) by mouth every morning.  -     escitalopram oxalate (LEXAPRO) 10 MG tablet; Take 1 tablet (10 mg total) by mouth daily.    Anxiety  -     escitalopram oxalate (LEXAPRO) 10 MG tablet; Take 1 tablet (10 mg total) by mouth daily    Screening for cervical cancer  -     Gynecologic Cytology (PAP Smear)    Heartburn- Switch to H2 blocker.  -     famotidine (PEPCID) 40 MG tablet; Take 1 tablet (40 mg total) by mouth every evening.    Encounter for screening for other metabolic disorders  -     Thyroid Cascade  -     Ferritin  -     Hemoglobin    Routine screening for STI (sexually transmitted infection)  -     HIV Antigen/Antibody Screening Cascade  -     Hepatitis C Antibody (Anti-HCV)           Arielle Francisco

## 2021-06-15 NOTE — PATIENT INSTRUCTIONS - HE
1) Continue on Wellbutrin  mg daily.    2) We will increase Lexapro to 20 mg daily.    3) Consider Gene Sight pharmacogenetic testing in future.    4) I will make a referral for neuropsychiatric testing to evaluate for ADHD.    5) Consider getting a full spectrum light for morning use.    6) Update me in 2 weeks with a My Chart Message.    7) Continue on the omeprazole for now but let me know if having any breakthrough heartburn.

## 2021-06-15 NOTE — PROGRESS NOTES
"Liliana Henley is a 26 y.o. female who is being evaluated via a billable video visit.      How would you like to obtain your AVS? MyChart.  If dropped from the video visit, the video invitation should be resent by: Send to e-mail at: [email or web address]  Will anyone else be joining your video visit? No      Video Start Time: 12:54 PM    Assessment & Plan     Liliana was seen today for follow-up.    Diagnoses and all orders for this visit:    Moderate major depression (H)  -     buPROPion (WELLBUTRIN XL) 300 MG 24 hr tablet; Take 1 tablet (300 mg total) by mouth every morning.  -     escitalopram oxalate (LEXAPRO) 20 MG tablet; Take 1 tablet (20 mg total) by mouth daily.    Heartburn    Disturbed concentration  -     AMB REFERRAL TO MENTAL HEALTH AND ADDICTION  - Adult (18+); Assessment and Testing; Neuropsychological Testing; [facility] [phone]; We will contact you to schedule the appointment or please call with any questions      Patient Instructions   1) Continue on Wellbutrin  mg daily.    2) We will increase Lexapro to 20 mg daily.    3) Consider [facility] S[facility] pharmacogenetic testing in future.    4) I will make a referral for neuropsychiatric testing to evaluate for ADHD.    5) Consider getting a full spectrum light for morning use.    6) Update me in 2 weeks with a My Chart Message.    7) Continue on the omeprazole for now but let me know if having any breakthrough heartburn.     Will work on weaning omeprazole once her mood has improved.    Review of external notes as documented in note  28 minutes spent on the date of the encounter doing chart review and patient visit          BMI:   Estimated body mass index is 33.82 kg/m  as calculated from the following:    Height as of 12/14/20: 5' 9.75\" (1.772 m).    Weight as of 12/14/20: 234 lb (106.1 kg).       Return in about 2 weeks (around 2/23/2021) for Send me My Chart Message.    Arielle Francisco MD  [facility] " "[facility]   Liliana Henley is 26 y.o. and presents today for the following health issues   HPI   Chief Complaint   Patient presents with     Follow-up     Depression med      She is here to f/u on depression.  She has been on Lexapro 10 mg and Wellbutrin 150 mg daily.  She was having less motivation so went up on the Wellbutrin to 300 mg daily on her own 2 weeks ago.  Initially thought it helped her concentrate but now she is having trouble with focusing on her work.  No side effects on increased dose of Wellbutrin.  She feels Lexapro has helped her anxiety and now having more issues with depressed mood.  She has decreased energy/ feels \"blah\" / lack of motivation / trouble with concentration and focus.  No suicidal ideation.  Contributing factors: Pandemic/ decreased light / has moved twice in last month.  She has been doing some walking and weights at home. Feels she is eating well.  Taking vitamin D and iron supplements.    She tried to wean off omeprazole - switched to famotidine 40 mg daily but her heartburn came back at end of day.  She went back on omeprazole 20 mg daily and heartburn is completely gone.  Eating a healthy diet.  Has some coffee in am but does not seem to bother her.    SH: Single.  Works from home.  Currently living in boyfriend's apartment - they plan to get a place together when his lease is up.  Patient Active Problem List   Diagnosis     Heartburn     Depression     Anxiety     Moderate major depression (H)        Review of Systems  As above      Objective       Vitals:  No vitals were obtained today due to virtual visit.    Physical Exam  GENERAL: Healthy, alert and no distress  EYES: Eyes grossly normal to inspection. No discharge or erythema, or obvious scleral/conjunctival abnormalities.  RESP: No audible wheeze, cough, or visible cyanosis.  No visible retractions or increased work of breathing.    NEURO: Cranial nerves grossly intact. Mentation and speech " appropriate for age.  PSYCH: Mentation appears normal, affect normal/bright, judgement and insight intact, normal speech and appearance well-groomed    PHQ-9 Total Score: 12 (2/9/2021 12:00 PM)            Video-Visit Details    Type of service:  Video Visit    Video End Time (time video stopped): 1:23 PM  Originating Location (pt. Location): Home    Distant Location (provider location):  [facility]     Platform used for Video Visit: [employer]

## 2021-06-15 NOTE — TELEPHONE ENCOUNTER
Refill Approved    Rx renewed per Medication Renewal Policy. Medication was last renewed on 12/14/20.    Jacoby Sandoval, [facility] Triage/Med Refill 2/8/2021     Requested Prescriptions   Pending Prescriptions Disp Refills     famotidine (PEPCID) 40 MG tablet [Pharmacy Med Name: FAMOTIDINE 40MG TABLETS] 30 tablet 1     Sig: TAKE 1 TABLET(40 MG) BY MOUTH EVERY EVENING       GI Medications Refill Protocol Passed - 2/8/2021  3:56 AM        Passed - PCP or prescribing provider visit in last 12 or next 3 months.     Last office visit with prescriber/PCP: 3/13/2018 Arielle Francisco MD OR same dept: Visit date not found OR same specialty: 3/13/2018 Arielle Francisco MD  Last physical: 12/14/2020 Last MTM visit: Visit date not found   Next visit within 3 mo: Visit date not found  Next physical within 3 mo: Visit date not found  Prescriber OR PCP: Arielle Francisco MD  Last diagnosis associated with med order: 1. Heartburn  - famotidine (PEPCID) 40 MG tablet [Pharmacy Med Name: FAMOTIDINE 40MG TABLETS]; TAKE 1 TABLET(40 MG) BY MOUTH EVERY EVENING  Dispense: 30 tablet; Refill: 1    If protocol passes may refill for 12 months if within 3 months of last provider visit (or a total of 15 months).

## 2021-06-15 NOTE — TELEPHONE ENCOUNTER
Spoke with pt over the phone to go over PHQ9 but was not having good connection on her phone. PHQ9 questions send to pt via my-chart to complete.     Last PHQ9 on 2/9/21 scored of 12 not at goal.

## 2021-06-16 PROBLEM — F32.1 MODERATE MAJOR DEPRESSION (H): Status: ACTIVE | Noted: 2020-12-14

## 2021-06-16 PROBLEM — F41.9 ANXIETY: Status: ACTIVE | Noted: 2019-08-30

## 2021-06-16 NOTE — PROGRESS NOTES
SUBJECTIVE: Liliana Henley is a 23 y.o. female with:  Chief Complaint   Patient presents with     Follow-up     f/u med check      Medication Refill     xnax and wellbutrin refills     Depression- She stopped Wellbutrin this June and was off it all summer and fall.  She was doing ok off the medicine.  She lost a cousin to cystic fibrosis this winter and mood has been down since then.  She has been spending a lot of time with her family.  Her work has been suffering due to depressed mood.  She restarted Wellbutrin  mg about 6 weeks ago but still feels depressed.  No suicidal ideation. She has been trying to work on a healthier lifestyle. She felt she was eating more poorly this winter.  No trouble sleeping.  No weight loss on the medication.      Fear of flying - She has panic when she flies and takes xanax which works well for her.  She is going to [facility] at end of March and would like a refill.  She denies any excess sedation or dizziness with the medication.    SH: Lives in house with roommates.  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  Diet- getting more balanced.  Exercise - 30 min every other day.  Alcohol- 1 glass wine / week.  Supplements: Vitamin D / MVI  ROS: Periods irregular after stopping Depo.  She is sexually active and using condoms.      OBJECTIVE: /70 (Patient Site: Right Arm, Patient Position: Sitting, Cuff Size: Adult Large)  Pulse 85  Resp 16  Wt (!) 253 lb 8 oz (115 kg)  SpO2 98%  Breastfeeding? No  BMI 36.58 kg/m2 no distress.  Psych Exam:  Mood: depressed  Affect: normal   Behavior: appropriate  ThoughtContent: logical  Judgement: appropriate  Insight: normal     Vitamin D, Total (25-Hydroxy)   Date Value Ref Range Status   01/30/2017 24.2 (L) 30.0 - 80.0 ng/mL Final      Wt Readings from Last 3 Encounters:   03/13/18 (!) 253 lb 8 oz (115 kg)   03/24/17 (!) 248 lb (112.5 kg)   01/30/17 (!) 248 lb (112.5 kg)       Liliana was seen today for follow-up and  medication refill.    Diagnoses and all orders for this visit:    Major depression, recurrent - Still symptomatic. Will increase Wellbutrin to 300 mg daily.  -     buPROPion (WELLBUTRIN XL) 150 MG 24 hr tablet; Take 2 po in am    Fear of flying- Avoid driving while taking med.  -     ALPRAZolam (XANAX) 0.5 MG tablet; Take 1 tab 30 min before flight - may repeat in 30 min prn       Patient Instructions   Start on Wellbutrin 150 mg - 2 in am.    Take vitamin D3 3000 IU daily.    Follow-up in 1 month for routine physical / pap and to recheck mood.    Arielle Francisco

## 2021-06-16 NOTE — PATIENT INSTRUCTIONS - HE
Dear Liliana Henley    After reviewing your responses, I've been able to diagnose you with a urinary tract infection, which is a common infection of the bladder with bacteria.  This is not a sexually transmitted infection, though urinating immediately after intercourse can help prevent infections.  Drinking lots of fluids is also helpful to clear your current infection and prevent the next one.      I have sent a prescription for antibiotics to your pharmacy to treat this infection.    It is important that you take all of your prescribed medication even if your symptoms are improving after a few doses.  Taking all of your medicine helps prevent the symptoms from returning.     If your symptoms worsen, you develop pain in your back or stomach, develop fevers, or are not improving in 5 days, please contact your primary care provider for an appointment or visit any of our convenient Walk-in or Urgent Care Centers to be seen, which can be found on our website here.    Thanks again for choosing us as your health care partner,    Marleny Mayers MD

## 2021-06-16 NOTE — TELEPHONE ENCOUNTER
Triage Call:    -Patient was prescribed antibiotic for UTI related to E-visit.   -Pharmacy where prescription was sent is closed, patient would like prescription sent to a pharmacy that is open until 10 pm.   -Dr. Marleny Mayers prescribed the following below:    nitrofurantoin, macrocrystal-monohydrate, (MACROBID) 100 MG capsule 10 capsule 0 3/27/2021 4/1/2021 --   Sig - Route: Take 1 capsule (100 mg total) by mouth 2 (two) times a day for 5 days. - Oral   Sent to pharmacy as: nitrofurantoin monohydrate/macrocrystals 100 mg capsule (Macrobid)   E-Prescribing Status: Receipt confirmed by pharmacy (3/27/2021  3:35 PM CDT)   Acute UTI (urinary tract infection) [N39.0]       Called new pharmacy per patient preference at [phone] to give verbal order for the following above. Gave verbal order to Pharmacist Matt prescription that was sent in by Dr. Mayers. Pharmacist verbalized understanding and agrees with plan. RN called patient back to inform her she can  prescription in the next 30 minutes. Patient verbalized understanding and agrees with plan.     Kathleen Mahmood RN, BSN Nurse Triage Advisor 7:44 PM 3/27/2021     Additional Information    Caller has medication question only, adult not sick, and triager answers question    Protocols used: MEDICATION QUESTION CALL-A-

## 2021-06-17 NOTE — TELEPHONE ENCOUNTER
Spoke with pt she was given 10mg #30 on 4/29 and with dr instruction was to increase as needed. Once increased to 30 mg.  rx was sent to pharm for 30 mg tabs.  Pt states she is out of the 10 mg so not able to increase to 40 mg. As instructed on 5/12 to do so.  Pt states would like to use 30 mg daily and 10 mg prn.  Pharmacy called informed of the increase and date.  Advised to please fill rx for 10 mg.  Spoke with Susan at pharmacy.  Insurance will not pay for 10 mg until 5/26 so she is welcome to pay cash if needed prior.

## 2021-06-17 NOTE — TELEPHONE ENCOUNTER
Pt picked up 10 mg #30 on 4/29 to take 1 daily with 30 mg  30mg #30 on 5/10 to take 1 per day with the 10 mg    If she is taking 40 mg daily she should have enough until 4/29 of both.  Confirmed with pharmacy.  She should not be out.

## 2021-06-17 NOTE — PATIENT INSTRUCTIONS - HE
1) Start on Adderall extended release 10 mg in the am.    2) Update me in 1 week with My Chart message,    3) Consider books by Dr. Eamon Reynolds - Driven to Distraction.    4) Consider meditation to help with focus.    5) Continue omeprazole for now.  We will revisit a taper of the medicine in future.

## 2021-06-17 NOTE — PROGRESS NOTES
"Liliana Henley is a 26 y.o. female who is being evaluated via a billable video visit.      How would you like to obtain your AVS? MyChart.  If dropped from the video visit, the video invitation should be resent by: Send to e-mail at: [email or web address]  Will anyone else be joining your video visit? No    Video Start Time: 12:15 pm    Assessment & Plan     Liliana was seen today for adhd and medication refill.    Diagnoses and all orders for this visit:    Attention deficit hyperactivity disorder (ADHD), predominantly inattentive type  -     dextroamphetamine-amphetamine (ADDERALL XR) 10 MG 24 hr capsule; Take 1 capsule (10 mg total) by mouth daily.  Once her dose is set, will need to come in to sign controlled substance contract and f/u every 6 months.    Heartburn    Moderate major depression (H)  -     escitalopram oxalate (LEXAPRO) 20 MG tablet; Take 1 tablet (20 mg total) by mouth daily.  -     buPROPion (WELLBUTRIN XL) 300 MG 24 hr tablet; Take 1 tablet (300 mg total) by mouth every morning.    General counseling for prescription of oral contraceptives  -     desogestreL-ethinyl estradioL (APRI) 0.15-0.03 mg per tablet; Take 1 tablet by mouth daily.      Patient Instructions   1) Start on Adderall extended release 10 mg in the am.    2) Update me in 1 week with My Chart message,    3) Consider books by Dr. Eamon Reynolds - Driven to Distraction.    4) Consider meditation to help with focus.    5) Continue omeprazole for now.  We will revisit a taper of the medicine in future.         Review of external notes as documented in note  25 minutes spent on the date of the encounter doing chart review, history and exam, documentation and further activities per the note  [id number]}     BMI:   Estimated body mass index is 33.82 kg/m  as calculated from the following:    Height as of 12/14/20: 5' 9.75\" (1.772 m).    Weight as of 12/14/20: 234 lb (106.1 kg).       No follow-ups on file.    Arielle Francisco MD  M " [facility]    Evin Henley is 26 y.o. and presents today for the following health issues   HPI 1) Depression - She went up on Lexapro to 20 mg daily.  Continues on Wellbutrin 300 mg daily.  No side effects on meds.  She feels the change in dose has helped.  Mood is up/down but not as bad as before.  Would like to continue current meds.    2) ADD- She has had trouble with concentration since childhood.  Did see psychologist then and dx with ADD.  Took stimulant, she thinks Adderall, in past and it helped.  She went back to office and thought it would help her concentration but it did not. She would like to retry a stimulant.    3) GERD - She tried to wean off omeprazole by taking famotidine 40 mg but was still having breath through heartburn so restarted OTC Prilosec 20 mg daily.  It completely takes care of her heartburn.    4) She is taking OCPs for contraception.  No side effects on the pill.  Her BP is normal.  Up to date with last pap.  She is sexually active.    Patient Active Problem List   Diagnosis     Heartburn     Depression     Anxiety     Moderate major depression (H)        Review of Systems  As above      Objective       Vitals:  No vitals were obtained today due to virtual visit.    Physical Exam  GENERAL: Healthy, alert and no distress  EYES: Eyes grossly normal to inspection. No discharge or erythema, or obvious scleral/conjunctival abnormalities.  RESP: No audible wheeze, cough, or visible cyanosis.  No visible retractions or increased work of breathing.    NEURO: Cranial nerves grossly intact. Mentation and speech appropriate for age.  PSYCH: Mentation appears normal, affect normal/bright, judgement and insight intact, normal speech and appearance well-groomed    PHQ-9 Total Score: 9 (4/29/2021 11:00 AM)   No data recorded            Video-Visit Details    Type of service:  Video Visit    Video End Time (time video stopped): 12:38 pm  Originating Location (pt.  Location): Home    Distant Location (provider location):  [facility]     Platform used for Video Visit: Ayden Francisco

## 2021-06-17 NOTE — TELEPHONE ENCOUNTER
I noted the increase of her dose already on the 10 mg rx so I am not sure what else to do.  Pt can continue on the 30 mg and I can send in rx for 40 mg on 5/25/21.  Please advise her.

## 2021-06-17 NOTE — TELEPHONE ENCOUNTER
Refill Approved    Rx renewed per Medication Renewal Policy. Medication was last renewed on 4/29/21.    Ira Mendoza, [facility] Triage/Med Refill 5/7/2021     Requested Prescriptions   Pending Prescriptions Disp Refills     buPROPion (WELLBUTRIN XL) 300 MG 24 hr tablet [Pharmacy Med Name: BUPROPION XL 300MG TABLETS] 30 tablet 11     Sig: TAKE 1 TABLET(300 MG) BY MOUTH EVERY MORNING       Tricyclics/Misc Antidepressant/Antianxiety Meds Refill Protocol Passed - 5/7/2021 11:31 AM        Passed - PCP or prescribing provider visit in last year     Last office visit with prescriber/PCP: 3/13/2018 Arielle Francisco MD OR same dept: Visit date not found OR same specialty: 3/13/2018 Arielle Francisco MD  Last physical: 12/14/2020 Last MTM visit: Visit date not found   Next visit within 3 mo: Visit date not found  Next physical within 3 mo: Visit date not found  Prescriber OR PCP: Arielle Francisco MD  Last diagnosis associated with med order: 1. Moderate major depression (H)  - buPROPion (WELLBUTRIN XL) 300 MG 24 hr tablet [Pharmacy Med Name: BUPROPION XL 300MG TABLETS]; TAKE 1 TABLET(300 MG) BY MOUTH EVERY MORNING  Dispense: 30 tablet; Refill: 11  - escitalopram oxalate (LEXAPRO) 20 MG tablet [Pharmacy Med Name: ESCITALOPRAM 20MG TABLETS]; TAKE 1 TABLET(20 MG) BY MOUTH DAILY  Dispense: 30 tablet; Refill: 11    If protocol passes may refill for 12 months if within 3 months of last provider visit (or a total of 15 months).                escitalopram oxalate (LEXAPRO) 20 MG tablet [Pharmacy Med Name: ESCITALOPRAM 20MG TABLETS] 30 tablet 11     Sig: TAKE 1 TABLET(20 MG) BY MOUTH DAILY       SSRI Refill Protocol  Passed - 5/7/2021 11:31 AM        Passed - PCP or prescribing provider visit in last year     Last office visit with prescriber/PCP: 3/13/2018 Arielle Francisco MD OR same dept: Visit date not found OR same specialty: 3/13/2018 Arielle Francisco MD  Last physical:  12/14/2020 Last MTM visit: Visit date not found   Next visit within 3 mo: Visit date not found  Next physical within 3 mo: Visit date not found  Prescriber OR PCP: Arielle Francisco MD  Last diagnosis associated with med order: 1. Moderate major depression (H)  - buPROPion (WELLBUTRIN XL) 300 MG 24 hr tablet [Pharmacy Med Name: BUPROPION XL 300MG TABLETS]; TAKE 1 TABLET(300 MG) BY MOUTH EVERY MORNING  Dispense: 30 tablet; Refill: 11  - escitalopram oxalate (LEXAPRO) 20 MG tablet [Pharmacy Med Name: ESCITALOPRAM 20MG TABLETS]; TAKE 1 TABLET(20 MG) BY MOUTH DAILY  Dispense: 30 tablet; Refill: 11    If protocol passes may refill for 12 months if within 3 months of last provider visit (or a total of 15 months).

## 2021-06-17 NOTE — TELEPHONE ENCOUNTER
Reason for Call:  Other prescription     Detailed comments: Currently it is too soon to refill dextroamphetamine-amphetamine (ADDERALL XR) 10 MG 24 hr capsule. Pt go the last refill on 4/29/21 for a dispense of 30 tablets. She was taking 1 tablet daily with her 30 mg. Pt is not eligible for a refill per insurance until 5/25/21.     Pt stated that you increased her 10 mg and that is why she is out. If this is true a new RX would be needed for this increase. Can try to send a RX for 5 mg, 2 tablets to see if pt's insurance will pay if there was not an increase.     Pt would be paying $61.93 out of pocket due to being an early fill for the same dosage sent on 4/29/21.    Phone Number Patient can be reached at: Other phone number:  [facility], \[facility] [place] [facility]\"", [phone]    Best Time:     Can we leave a detailed message on this number?: Yes    Call taken on 5/13/2021 at 3:01 PM by Erin Pineda

## 2021-06-21 NOTE — LETTER
Letter by Arielle Francisco MD at      Author: Arielle Francisco MD Service: -- Author Type: --    Filed:  Encounter Date: 12/14/2020 Status: (Other)                    My Depression Action Plan  Name: Liliana Henley   Date of Birth 1995  Date: 12/14/2020    My Doctor: Arielle Francisco MD   My Clinic: [address] [address]  [phone]          GREEN    ZONE   Good Control    What it looks like:     Things are going generally well. You have normal ups and downs. You may even feel depressed from time to time, but bad moods usually last less than a day.   What you need to do:  1. Continue to care for yourself (see self care plan)  2. Check your depression survival kit and update it as needed  3. Follow your physicians recommendations including any medication.  4. Do not stop taking medication unless you consult with your physician first.           YELLOW         ZONE Getting Worse    What it looks like:     Depression is starting to interfere with your life.     It may be hard to get out of bed; you may be starting to isolate yourself from others.    Symptoms of depression are starting to last most all day and this has happened for several days.     You may have suicidal thoughts but they are not constant.   What you need to do:     1. Call your care team. Your response to treatment will improve if you keep your care team informed of your progress. Yellow periods are signs an adjustment may need to be made.     2. Continue your self-care.  Just get dressed and ready for the day.  Don't give yourself time to talk yourself out of it.    3. Talk to someone in your support network.    4. Open up your depression Depression Self-Care Plan / Wellness kit.           RED    ZONE Medical Alert - Get Help    What it looks like:     Depression is seriously interfering with your life.     You may experience these or other symptoms: You  cant get out of bed most days, cant work or engage in other necessary activities, you have trouble taking care of basic hygiene, or basic responsibilities, thoughts of suicide or death that will not go away, self-injurious behavior.     What you need to do:  1. Call your care team and request a same-day appointment. If they are not available (weekends or after hours) call your local crisis line, emergency room or 911.            Self-Care Plan / Wellness Kit    Self-Care for Depression  Heres the deal. Your body and mind are really not as separate as most people think.  What you do and think affects how you feel and how you feel influences what you do and think. This means if you do things that people who feel good do, it will help you feel better.  Sometimes this is all it takes.  There is also a place for medication and therapy depending on how severe your depression is, so be sure to consult with your medical provider and/ or Behavioral Health Consultant if your symptoms are worsening or not improving.     In order to better manage my stress, I will:    Exercise  Get some form of exercise, every day. This will help reduce pain and release endorphins, the feel good chemicals in your brain. This is almost as good as taking antidepressants!  This is not the same as joining a gym and then never going! (they count on that by the way?) It can be as simple as just going for a walk or doing some gardening, anything that will get you moving.      Hygiene   Maintain good hygiene (get out of bed in the morning, make your bed, brush your teeth, take a shower, and get dressed like you were going to work, even if you are unemployed).  If your clothes don't fit try to get ones that do.    Diet  Strive to eat foods that are good for me, drink plenty of water, and avoid excessive sugar, caffeine, alcohol, and other mood-altering substances.  Some foods that are helpful in depression are: complex carbohydrates, B vitamins,  flaxseed, fish or fish oil, fresh fruits and vegetables.    Psychotherapy  Agree to participate in Individual Therapy (if recommended).    Medication  If prescribed medications, I agree to take them.  Missing doses can result in serious side effects.  I understand that drinking alcohol, or other illicit drug use, may cause potential side effects.  I will not stop my medication abruptly without first discussing it with my provider.    Staying Connected With Others  Stay in touch with my friends, family members, and my primary care provider/team.    Use your imagination  Be creative.  We all have a creative side; it doesnt matter if its oil painting, sand castles, or mud pies! This will also kick up the endorphins.    Witness Beauty  (AKA stop and smell the roses) Take a look outside, even in mid-winter. Notice colors, textures. Watch the squirrels and birds.     Service to others  Be of service to others.  There is always someone else in need.  By helping others we can get out of ourselves and remember the really important things.  This also provides opportunities for practicing all the other parts of the program.    Humor  Laugh and be silly!  Adjust your TV habits for less news and crime-drama and more comedy.    Control your stress  Try breathing deep, massage therapy, biofeedback, and meditation. Find time to relax each day.     Crisis Text Line  http://www.crisistextline.org    The Crisis Text Line serves anyone, in any type of crisis, providing access to free, 24/7 support and information via the medium people already use and trust:    Here's how it works:  1.  Text 863-548 from anywhere in the [place], anytime, about any type of crisis.  2.  A live, trained Crisis Counselor receives the text and responds quickly.  3.  The volunteer Crisis Counselor will help you move from a 'hot moment to a cool moment'.  My support system    Clinic Contact:  Phone number:    Contact 1:  Phone number:    Contact 2:  Phone number:     Jew/:  Phone number:    Therapist:  Phone number:    [facility] crisis center:    Phone number:    Other community support:  Phone number:

## 2021-06-26 ENCOUNTER — HEALTH MAINTENANCE LETTER (OUTPATIENT)
Age: 26
End: 2021-06-26

## 2021-06-26 NOTE — PATIENT INSTRUCTIONS - HE
Dear Liliana Henley,    We are sorry you are not feeling well. Based on the responses you provided, it is recommended that you be seen in-person in urgent care so we can better evaluate your symptoms. Since these infections keep happening it is a good idea to have this urine tested and cultured to make sure you are getting the right treatment. Please click Here to find the nearest urgent care location to you.   You will not be charged for this Visit. Thank you for trusting us with your care.    Herbert Leggett PA-C

## 2021-06-26 NOTE — PROGRESS NOTES
"Liliana was seen today for follow-up and medication refill.    Diagnoses and all orders for this visit:    Attention deficit hyperactivity disorder (ADHD), predominantly inattentive type- Doing well on stimulant.  Controlled substance contract signed.  F/U in 6 months.  -     dextroamphetamine-amphetamine (ADDERALL XR) 30 MG 24 hr capsule; Take 1 capsule (30 mg total) by mouth daily.  -     dextroamphetamine-amphetamine (ADDERALL XR) 10 MG 24 hr capsule; Take 1 po in am with 30 mg tablet for total dose of 40 mg  -     Drug Abuse 1+, Urine    Heartburn- Improved with better nutrition.  F/U if needing to take PPI on more regular basis.     SUBJECTIVE: Liliana Henley is a 26 y.o. female with:  Chief Complaint   Patient presents with     Follow-up     Med check     Medication Refill    1) ADD- She recently started on stimulant medicine.  Has been titrating the dose up.  Taking Adderall XR 40 mg in am.  Feels helps with concentration for work.  Has not taken med on weekends.  No side effects.  Sleep and appetite are ok.  No increased anxiety. She recently moved and has new apartment with lots of light so enjoys working from home.    2) Depression - Mood doing well on current meds.    3) GERD - She has not needed Prilosec daily.  She did [employer] and digestion is much improved.  Only has needed Prilosec occasionally.    4) Doing well on OCPs.    OBJECTIVE: /70 (Patient Site: Left Arm, Patient Position: Sitting, Cuff Size: Adult Regular)   Pulse 71   Ht 5' 9.75\" (1.772 m)   Wt 208 lb 14.4 oz (94.8 kg)   SpO2 98%   BMI 30.19 kg/m   no distress  PSYCH:  Awake, alert, and oriented x 3.  Mood and affect are appropriate.  Good eye contact.  Speech is normal.  No suicidal ideation.  No hallucinations.    PHQ-9 Total Score: 4 (6/7/2021  9:00 AM)    Arielle Francisco   "

## 2021-07-04 NOTE — ADDENDUM NOTE
Addendum Note by Arielle Gupta MD at 5/12/2021  2:13 PM     Author: Arielle Gupta MD Service: -- Author Type: Physician    Filed: 5/12/2021  2:13 PM Encounter Date: 4/29/2021 Status: Signed    : Arielle Gupta MD (Physician)    Addended by: ARIELLE GUPTA on: 5/12/2021 02:13 PM        Modules accepted: Orders

## 2021-07-04 NOTE — LETTER
Letter by Arielle Francisco MD at      Author: Arielle Francisco MD Service: -- Author Type: --    Filed:  Encounter Date: 6/7/2021 Status: (Other)       Non-Opioid Controlled Substance Agreement    This is an agreement between you and your provider regarding safe and appropriate controlled substance prescribing.? Controlled substances are?medicines that can cause physical and mental dependence. The manufacturing, possession and use of these medicines are regulated by law.  We here at [facility] are making a commitment to work with you in your efforts to get better.? To support you in this work, we will help you schedule regular appointments for medicine refills. If we must cancel or change your appointment for any reason, we will make sure you have enough medication to last until your next appointment.      As a Provider, I will:     Listen carefully to your concerns while treating you with dignity.     Recommend a treatment plan that I believe is in your best interest and may involve therapies other than medication.      Review the chance of benefit and the chance of harm of this medicine with you regularly and evaluate the safety and effectiveness of this therapy.       Provide a plan on how to discontinue if the decision is made to stop this medicine.       As a Patient, I understand controlled substances:       Are prescribed by my care provider to help me function or work and manage my condition(s).?    Are strong medicines and can cause serious side effects.      Need to be taken exactly as prescribed.?Combining controlled substances with certain medicines or chemicals (such as illegal drugs, alcohol, sedatives, sleeping pills, and benzodiazepines) can be dangerous or even fatal.? If I stop taking my medicines suddenly, I may have severe withdrawal symptoms.     The risks, benefits, and side effects of these medicine(s) were explained to me. I agree that:    1. I will take part in  other treatments as advised by my care team. This may be psychiatry or counseling, physical therapy, behavioral therapy, group treatment or a referral to specialist.    2. I will keep all my appointments and understand this is part of the monitoring of controlled substance.?My care team may require an office visit for EVERY controlled substance refill. If I miss appointments or dont follow instructions, my care team may stop my medicine    3. I will take my medicines as prescribed. I will not change the dose or schedule unless my care team tells me to. There will be no refills if I run out early.      4. I may be contactedwithout warning and asked to complete a urine drug test or pill count at any time. If I dont give a urine sample or participate in a pill count, the care team may stop my medicine.    5. I will only receive controlled substance prescriptions from this clinic. If treated by another provider, I will let them know I am taking controlled substances and that I have a treatment agreement with this provider. I will inform this care team within one business day if I am given a prescription for any controlled substance by another healthcare provider. This care team may contact other providers and pharmacists about my use of the medicines.     6. It is up to me to make sure that I do not run out of my medicines on weekends or holidays.?If my care team is willing to refill my prescription without a visit, I must request refills only during office hours. Refills may take up to 3 business days to process.  I will use one pharmacy to fill all my controlled substance prescriptions.  I will notify the clinic if any changes are made due to insurance changes or medication availability.    7. I am responsible for my prescriptions. If the medicine/prescription is lost, stolen or destroyed, it will not be replaced.?I also agree not to share controlled substance medicines with anyone.     8. I am aware I should not use  any illegal or recreational drugs. I agree not to drink alcohol unless my care team says I can.     9. If I enroll in the [place] Medical Cannabis program, I will tell my care team.?    10. I will tell my care team right away if I become pregnant, have a new medical problem treated outside of my regular clinic, or have a change in my medications.     11. I understand that this medicine can affect my thinking, judgment and reaction time.? Alcohol and drugs affect the brain and body, which can affect the safety of a persons driving. Being under the influence of alcohol or drugs can affect a persons decision-making, behaviors, personal safety, and the safety of others. Driving while impaired (DWI) can occur if a person is driving, operating, or in physical control of a car, motorcycle, boat, snowmobile, ATV, motorbike, off-road vehicle, or any other motor vehicle ([place] Statute 169A.20). I understand the risk if I choose to drive or operate machinery  I understand that if I do not follow any of the conditions above, my prescriptions or treatment may be stopped or changed.     I agree that my provider, clinic care team, and pharmacy may work with any city, state or federal law enforcement agency that investigates the misuse, sale, or other diversion of my controlled medicine. I will allow my provider to discuss my care with or share a copy of this agreement with any other treating provider, pharmacy or emergency room where I receive care.?    I have read this agreement and have asked questions about anything I did not understand.    Adderall XR 40 mg in am  Visits every 6 month    ________________________________________________________  Patient Signature - Liliana Henley     ___________________                   Date     ________________________________________________________  Provider Signature - Arielle Francisco MD       ___________________                   Date      ________________________________________________________  Witness Signature (required if provider not present while patient signing)          ___________________                   Date

## 2021-07-06 VITALS
HEIGHT: 70 IN | SYSTOLIC BLOOD PRESSURE: 118 MMHG | HEART RATE: 71 BPM | WEIGHT: 208.9 LBS | BODY MASS INDEX: 29.91 KG/M2 | DIASTOLIC BLOOD PRESSURE: 70 MMHG | OXYGEN SATURATION: 98 %

## 2021-07-06 ASSESSMENT — PATIENT HEALTH QUESTIONNAIRE - PHQ9: SUM OF ALL RESPONSES TO PHQ QUESTIONS 1-9: 4

## 2021-09-02 ENCOUNTER — MYC MEDICAL ADVICE (OUTPATIENT)
Dept: FAMILY MEDICINE | Facility: CLINIC | Age: 26
End: 2021-09-02

## 2021-09-02 DIAGNOSIS — F90.0 ATTENTION DEFICIT HYPERACTIVITY DISORDER (ADHD), PREDOMINANTLY INATTENTIVE TYPE: ICD-10-CM

## 2021-09-02 RX ORDER — DEXTROAMPHETAMINE SACCHARATE, AMPHETAMINE ASPARTATE MONOHYDRATE, DEXTROAMPHETAMINE SULFATE AND AMPHETAMINE SULFATE 2.5; 2.5; 2.5; 2.5 MG/1; MG/1; MG/1; MG/1
CAPSULE, EXTENDED RELEASE ORAL
Qty: 90 CAPSULE | Refills: 0 | Status: SHIPPED | OUTPATIENT
Start: 2021-09-02 | End: 2021-12-02

## 2021-09-02 RX ORDER — DEXTROAMPHETAMINE SACCHARATE, AMPHETAMINE ASPARTATE MONOHYDRATE, DEXTROAMPHETAMINE SULFATE AND AMPHETAMINE SULFATE 7.5; 7.5; 7.5; 7.5 MG/1; MG/1; MG/1; MG/1
30 CAPSULE, EXTENDED RELEASE ORAL DAILY
Qty: 90 CAPSULE | Refills: 0 | Status: SHIPPED | OUTPATIENT
Start: 2021-09-02 | End: 2021-12-02

## 2021-10-16 ENCOUNTER — HEALTH MAINTENANCE LETTER (OUTPATIENT)
Age: 26
End: 2021-10-16

## 2021-10-19 PROBLEM — F32.9 MAJOR DEPRESSION: Status: ACTIVE | Noted: 2020-12-14

## 2021-12-02 ENCOUNTER — VIRTUAL VISIT (OUTPATIENT)
Dept: FAMILY MEDICINE | Facility: CLINIC | Age: 26
End: 2021-12-02
Payer: COMMERCIAL

## 2021-12-02 DIAGNOSIS — F90.0 ATTENTION DEFICIT HYPERACTIVITY DISORDER (ADHD), PREDOMINANTLY INATTENTIVE TYPE: ICD-10-CM

## 2021-12-02 DIAGNOSIS — F32.4 MAJOR DEPRESSIVE DISORDER IN PARTIAL REMISSION, UNSPECIFIED WHETHER RECURRENT (H): Primary | ICD-10-CM

## 2021-12-02 PROCEDURE — 99213 OFFICE O/P EST LOW 20 MIN: CPT | Mod: GT | Performed by: FAMILY MEDICINE

## 2021-12-02 RX ORDER — DEXTROAMPHETAMINE SACCHARATE, AMPHETAMINE ASPARTATE MONOHYDRATE, DEXTROAMPHETAMINE SULFATE AND AMPHETAMINE SULFATE 2.5; 2.5; 2.5; 2.5 MG/1; MG/1; MG/1; MG/1
CAPSULE, EXTENDED RELEASE ORAL
Qty: 90 CAPSULE | Refills: 0 | Status: SHIPPED | OUTPATIENT
Start: 2021-12-02 | End: 2022-12-02

## 2021-12-02 RX ORDER — DEXTROAMPHETAMINE SACCHARATE, AMPHETAMINE ASPARTATE MONOHYDRATE, DEXTROAMPHETAMINE SULFATE AND AMPHETAMINE SULFATE 7.5; 7.5; 7.5; 7.5 MG/1; MG/1; MG/1; MG/1
30 CAPSULE, EXTENDED RELEASE ORAL DAILY
Qty: 90 CAPSULE | Refills: 0 | Status: SHIPPED | OUTPATIENT
Start: 2021-12-02 | End: 2022-12-02
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
## 2021-12-03 ASSESSMENT — PATIENT HEALTH QUESTIONNAIRE - PHQ9: SUM OF ALL RESPONSES TO PHQ QUESTIONS 1-9: 7

## 2022-02-05 ENCOUNTER — HEALTH MAINTENANCE LETTER (OUTPATIENT)
Age: 27
End: 2022-02-05

## 2022-04-06 DIAGNOSIS — Z30.09 GENERAL COUNSELING FOR PRESCRIPTION OF ORAL CONTRACEPTIVES: ICD-10-CM

## 2022-04-07 RX ORDER — DESOGESTREL AND ETHINYL ESTRADIOL 0.15-0.03
KIT ORAL
Qty: 84 TABLET | Refills: 0 | Status: SHIPPED | OUTPATIENT
Start: 2022-04-07 | End: 2022-06-30

## 2022-04-08 NOTE — TELEPHONE ENCOUNTER
"Last Written Prescription Date:  4/29/2021  Last Fill Quantity: 84,  # refills: 3   Last office visit provider:  12/2/2021     Requested Prescriptions   Pending Prescriptions Disp Refills     APRI 0.15-30 MG-MCG tablet [Pharmacy Med Name: Apri Oral Tablet 0.15-30 MG-MCG] 84 tablet 0     Sig: Take 1 tablet by mouth daily.       Contraceptives Protocol Passed - 4/6/2022  5:29 PM        Passed - Patient is not a current smoker if age is 35 or older        Passed - Recent (12 mo) or future (30 days) visit within the authorizing provider's specialty     Patient has had an office visit with the authorizing provider or a provider within the authorizing providers department within the previous 12 mos or has a future within next 30 days. See \"Patient Info\" tab in inbasket, or \"Choose Columns\" in Meds & Orders section of the refill encounter.              Passed - Medication is active on med list        Passed - No active pregnancy on record        Passed - No positive pregnancy test in past 12 months             Ju Cartwright RN 04/07/22 10:00 PM  "

## 2022-05-02 DIAGNOSIS — F32.1 MODERATE MAJOR DEPRESSION (H): ICD-10-CM

## 2022-05-05 RX ORDER — BUPROPION HYDROCHLORIDE 300 MG/1
TABLET ORAL
Qty: 30 TABLET | Refills: 0 | Status: SHIPPED | OUTPATIENT
Start: 2022-05-05 | End: 2022-07-23

## 2022-05-05 RX ORDER — ESCITALOPRAM OXALATE 20 MG/1
TABLET ORAL
Qty: 30 TABLET | Refills: 0 | Status: SHIPPED | OUTPATIENT
Start: 2022-05-05 | End: 2022-07-23

## 2022-05-05 NOTE — TELEPHONE ENCOUNTER
"Routing refill request to provider for review/approval because:  PHQ9 score - greater than 4.    Last Written Prescription Date:  5/7/21  Last Fill Quantity: 90,  # refills: 3   Last office visit provider:  12/2/21     Requested Prescriptions   Pending Prescriptions Disp Refills     buPROPion (WELLBUTRIN XL) 300 MG 24 hr tablet [Pharmacy Med Name: buPROPion HCl ER (XL) Oral Tablet Extended Release 24 Hour 300 MG] 30 tablet 0     Sig: Take 1 tablet (300 mg total) by mouth every morning.       SSRIs Protocol Failed - 5/5/2022  7:18 AM        Failed - PHQ-9 score less than 5 in past 6 months     Please review last PHQ-9 score.           Passed - Medication is Bupropion     If the medication is Bupropion (Wellbutrin), and the patient is taking for smoking cessation; OK to refill.          Passed - Medication is active on med list        Passed - Patient is age 18 or older        Passed - No active pregnancy on record        Passed - No positive pregnancy test in last 12 months        Passed - Recent (6 mo) or future (30 days) visit within the authorizing provider's specialty     Patient had office visit in the last 6 months or has a visit in the next 30 days with authorizing provider or within the authorizing provider's specialty.  See \"Patient Info\" tab in inbasket, or \"Choose Columns\" in Meds & Orders section of the refill encounter.               escitalopram (LEXAPRO) 20 MG tablet [Pharmacy Med Name: Escitalopram Oxalate Oral Tablet 20 MG] 30 tablet 0     Sig: Take 1 tablet (20 mg total) by mouth daily.       SSRIs Protocol Failed - 5/2/2022  2:00 AM        Failed - PHQ-9 score less than 5 in past 6 months     Please review last PHQ-9 score.           Passed - Medication is Bupropion     If the medication is Bupropion (Wellbutrin), and the patient is taking for smoking cessation; OK to refill.          Passed - Medication is active on med list        Passed - Patient is age 18 or older        Passed - No active " "pregnancy on record        Passed - No positive pregnancy test in last 12 months        Passed - Recent (6 mo) or future (30 days) visit within the authorizing provider's specialty     Patient had office visit in the last 6 months or has a visit in the next 30 days with authorizing provider or within the authorizing provider's specialty.  See \"Patient Info\" tab in inbasket, or \"Choose Columns\" in Meds & Orders section of the refill encounter.                 Jacoby Sandoval RN 05/05/22 7:19 AM    "

## 2022-10-01 ENCOUNTER — HEALTH MAINTENANCE LETTER (OUTPATIENT)
Age: 27
End: 2022-10-01

## 2022-12-01 ENCOUNTER — E-VISIT (OUTPATIENT)
Dept: FAMILY MEDICINE | Facility: CLINIC | Age: 27
End: 2022-12-01
Payer: COMMERCIAL

## 2022-12-01 DIAGNOSIS — F32.1 MODERATE MAJOR DEPRESSION (H): Primary | ICD-10-CM

## 2022-12-01 PROCEDURE — 99421 OL DIG E/M SVC 5-10 MIN: CPT | Performed by: FAMILY MEDICINE

## 2022-12-01 ASSESSMENT — ANXIETY QUESTIONNAIRES
3. WORRYING TOO MUCH ABOUT DIFFERENT THINGS: SEVERAL DAYS
1. FEELING NERVOUS, ANXIOUS, OR ON EDGE: SEVERAL DAYS
4. TROUBLE RELAXING: NOT AT ALL
2. NOT BEING ABLE TO STOP OR CONTROL WORRYING: SEVERAL DAYS
6. BECOMING EASILY ANNOYED OR IRRITABLE: MORE THAN HALF THE DAYS
7. FEELING AFRAID AS IF SOMETHING AWFUL MIGHT HAPPEN: SEVERAL DAYS
GAD7 TOTAL SCORE: 6
7. FEELING AFRAID AS IF SOMETHING AWFUL MIGHT HAPPEN: SEVERAL DAYS
8. IF YOU CHECKED OFF ANY PROBLEMS, HOW DIFFICULT HAVE THESE MADE IT FOR YOU TO DO YOUR WORK, TAKE CARE OF THINGS AT HOME, OR GET ALONG WITH OTHER PEOPLE?: VERY DIFFICULT
GAD7 TOTAL SCORE: 6
5. BEING SO RESTLESS THAT IT IS HARD TO SIT STILL: NOT AT ALL
GAD7 TOTAL SCORE: 6

## 2022-12-01 ASSESSMENT — PATIENT HEALTH QUESTIONNAIRE - PHQ9
10. IF YOU CHECKED OFF ANY PROBLEMS, HOW DIFFICULT HAVE THESE PROBLEMS MADE IT FOR YOU TO DO YOUR WORK, TAKE CARE OF THINGS AT HOME, OR GET ALONG WITH OTHER PEOPLE: EXTREMELY DIFFICULT
SUM OF ALL RESPONSES TO PHQ QUESTIONS 1-9: 13
SUM OF ALL RESPONSES TO PHQ QUESTIONS 1-9: 13

## 2022-12-02 RX ORDER — ESCITALOPRAM OXALATE 20 MG/1
TABLET ORAL
Qty: 30 TABLET | Refills: 5 | Status: SHIPPED | OUTPATIENT
Start: 2022-12-02 | End: 2023-06-22

## 2022-12-02 RX ORDER — BUPROPION HYDROCHLORIDE 300 MG/1
TABLET ORAL
Qty: 30 TABLET | Refills: 5 | Status: SHIPPED | OUTPATIENT
Start: 2022-12-02 | End: 2023-06-22

## 2022-12-02 ASSESSMENT — PATIENT HEALTH QUESTIONNAIRE - PHQ9: SUM OF ALL RESPONSES TO PHQ QUESTIONS 1-9: 13

## 2022-12-02 ASSESSMENT — ANXIETY QUESTIONNAIRES: GAD7 TOTAL SCORE: 6

## 2022-12-02 NOTE — PATIENT INSTRUCTIONS
Depression: Tips to Help Yourself    As your healthcare providers help treat your depression, you can also help yourself. Keep in mind that your illness affects you emotionally, physically, mentally, and socially. So full recovery will take time. Take care of your body and your soul, and be patient with yourself as you get better.  Self-care    Educate yourself. Read about treatment and medicine options. If you have the energy, attend local conferences or support groups. Keep a list of useful websites and helpful books and use them as needed. This illness is not your fault. Don t blame yourself for your depression.    Manage early symptoms. If you notice symptoms returning, experience triggers, or identify other factors that may lead to a depressive episode, get help as soon as possible. Ask trusted friends and family to monitor your behavior and let you know if they see anything of concern.    Work with your provider. Find a provider you can trust. Communicate honestly with that person and share information on your treatment for depression and your reaction to medicines.    Be prepared for a crisis. Know what to do if you experience a crisis. Keep the phone number of a crisis hotline and know the location of your community's urgent care centers and the closest emergency department.    Hold off on big decisions. Depression can cloud your judgment. So wait until you feel better before making major life decisions, such as changing jobs, moving, or getting  or .    Be patient. Recovering from depression is a process. Don t be discouraged if it takes some time to feel better.    Keep it simple. Depression saps your energy and concentration. So you won t be able to do all the things you used to do. Set small goals and do what you can.    Be with others. Don t isolate yourself--you ll only feel worse. Try to be with other people. And take part in fun activities when you can. Go to a movie, ballgame,  Gnosticism service, or social event. Talk openly with people you can trust. And accept help when it s offered.    Take care of your body  People with depression often lose the desire to take care of themselves. That only makes their problems worse. During treatment and afterward, make a point to:    Exercise. It s a great way to take care of your body. And studies have shown that exercise helps fight depression. Aim for 30 minutes of moderate activity a day. Walking in small blocks of time (5-10 minutes) is a good way to start, but anything that gets you moving (gardening, house cleaning) counts.    Don't use drugs and alcohol. These may ease the pain in the short term. But they ll only make your problems worse in the long run.    Get relief from stress. Ask your healthcare provider for relaxation exercises and techniques to help relieve stress. Consider activities like meditation, yoga, or Oliver Chi.    Eat right. A balanced and healthy diet helps keep your body healthy.    Get adequate sleep. Aim for 8 hours per night. Too much or too little sleep can cause other physical and emotional problems.  [employer] last reviewed this educational content on 12/1/2019 2000-2021 [employer]. All rights reserved. This information is not intended as a substitute for professional medical care. Always follow your healthcare professional's instructions.

## 2022-12-02 NOTE — TELEPHONE ENCOUNTER
Provider E-Visit time total (minutes): 10    Over the last 2 weeks, how often have you been bothered by any of the following problems?    1.  Little interest or pleasure in doing things More than half the days   2.  Feeling down, depressed, or hopeless More than half the days   3.  Trouble falling or staying asleep, or sleeping too much Several days   4.  Feeling tired or having little energy More than half the days   5.  Poor appetite or overeating More than half the days   6.  Feeling bad about yourself - or that you are a failure or have let yourself or your family down More than half the days   7.  Trouble concentrating on things, such as reading the newspaper or watching television More than half the days   8.  Moving or speaking so slowly that other people could have noticed. Or the opposite - being so fidgety or restless that you have been moving around a lot more than usual Not at all   9.  Thoughts that you would be better off dead, or of hurting yourself in some way Not at all   If you checked off any problems, how difficult have these problems made it for you to do your work, take care of things at home, or get along with other people? Extremely difficult   PHQ9 TOTAL SCORE (range: 0 - 27) 13 (Moderate depression)     Fv Mychart E-Visit Depression Anxiety 2    Question 12/1/2022 10:38 AM CST - Filed by Patient   Is Anxiety one of your symptoms? Yes     Fv Mychart E-Visit Depression Anxiety Jose Roberto-7 ([employer],2002; Used With Permission)    Question 12/1/2022 10:38 AM CST - Filed by Patient   Over the last two weeks, how often have you been bothered by the following problems?    1. Feeling nervous, anxious, or on edge Several days   2. Not being able to stop or control worrying Several days   3. Worrying too much about different things Several days   4. Trouble relaxing Not at all   5. Being so restless that it is hard to sit still Not at all   6. Becoming easily annoyed or irritable More than half the days    7. Feeling afraid, as if something awful might happen Several days   How difficult have these problems made it for you to do your work, take care of things at home, or get along with other people? Very difficult   BRENDON 7 TOTAL SCORE (range: 0 - 21) 6 (mild anxiety)     Fv Mychart E-Visit Depression Anxiety 3    Question 12/1/2022 10:38 AM CST - Filed by Patient   Status since last visit: Worse   Significant life event: Yes     Fv Mychart E-Visit Depression Anxiety 4    Question 12/1/2022 10:38 AM CST - Filed by Patient   Please writie a few words describing your life event. 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 He has been having health issues that is effecting his memory. I decided to quit my current job (that I really enjoy) and start working for them. Also, my fiance and I bought a house in April of last year. Money has been really tight.     Fv Mychart E-Visit Depression Anxiety 5    Question 12/1/2022 10:38 AM CST - Filed by Patient   Indicate any current substance use. (Alcohol, Marijuana, Cocaine, Heroine, Amphetamine, Other, None) None     Fv Mychart E-Visit Depression Anxiety 7    Question 12/1/2022 10:38 AM CST - Filed by Patient   Do you experience anxiety attacks or panic attacks? No     Fv Mychart E-Visit Depression Anxiety 9    Question 12/1/2022 10:38 AM CST - Filed by Patient   How regularly do you take your depression/anxiety medications? 76% - 100%     Fv Mychart E-Visit Depression Anxiety 10    Question 12/1/2022 10:38 AM CST - Filed by Patient   What side effects does your medication cause? (Auditory, Dry Mouth, Headaches, Impotence, Insomnia, Joint Pain, Limb Pain, Rash, Urinary, Visual, Weight Gain, Weight Loss, Sleepiness, None) None   Wrap up    Anything else you would like to add?    Are you pregnant or breastfeeding? I am confident that I am neither     She is taking Wellbutrin  mg daily and Lexapro 20 mg daily.    .Liliana was seen today for depression and  depression.    Diagnoses and all orders for this visit:    Moderate major depression (H)  -     escitalopram (LEXAPRO) 20 MG tablet; Take 1 tablet (20 mg total) by mouth daily.  -     buPROPion (WELLBUTRIN XL) 300 MG 24 hr tablet; Take 1 tablet (300 mg total) by mouth every morning.  -     Mental Health Referral; Future     have refilled your medication and also placed a referral to psychiatry for medication review.  You are taking max dose of Wellbutrin and we don't like to go much higher on Lexapro.  It would be good to have your meds reviewed as you are still having symptoms.  I also recommend the following steps:     Regular exercise  Vitamin D3 2000 international unit(s) daily  Fish oil 1000 mg daily  Multivitamin daily     Please let me know if not improving in the next few weeks.     Arielle Francisco MD   .

## 2023-05-13 ENCOUNTER — HEALTH MAINTENANCE LETTER (OUTPATIENT)
Age: 28
End: 2023-05-13

## 2023-06-21 ENCOUNTER — E-VISIT (OUTPATIENT)
Dept: FAMILY MEDICINE | Facility: CLINIC | Age: 28
End: 2023-06-21

## 2023-06-21 DIAGNOSIS — F32.1 MODERATE MAJOR DEPRESSION (H): ICD-10-CM

## 2023-06-21 DIAGNOSIS — F33.1 MAJOR DEPRESSIVE DISORDER, RECURRENT EPISODE, MODERATE (H): Primary | ICD-10-CM

## 2023-06-21 PROCEDURE — 99207 PR NON-BILLABLE SERV PER CHARTING: CPT | Performed by: FAMILY MEDICINE

## 2023-06-21 ASSESSMENT — ANXIETY QUESTIONNAIRES
8. IF YOU CHECKED OFF ANY PROBLEMS, HOW DIFFICULT HAVE THESE MADE IT FOR YOU TO DO YOUR WORK, TAKE CARE OF THINGS AT HOME, OR GET ALONG WITH OTHER PEOPLE?: EXTREMELY DIFFICULT
3. WORRYING TOO MUCH ABOUT DIFFERENT THINGS: NEARLY EVERY DAY
7. FEELING AFRAID AS IF SOMETHING AWFUL MIGHT HAPPEN: MORE THAN HALF THE DAYS
5. BEING SO RESTLESS THAT IT IS HARD TO SIT STILL: SEVERAL DAYS
6. BECOMING EASILY ANNOYED OR IRRITABLE: NEARLY EVERY DAY
GAD7 TOTAL SCORE: 17
4. TROUBLE RELAXING: MORE THAN HALF THE DAYS
2. NOT BEING ABLE TO STOP OR CONTROL WORRYING: NEARLY EVERY DAY
1. FEELING NERVOUS, ANXIOUS, OR ON EDGE: NEARLY EVERY DAY
GAD7 TOTAL SCORE: 17
7. FEELING AFRAID AS IF SOMETHING AWFUL MIGHT HAPPEN: MORE THAN HALF THE DAYS
GAD7 TOTAL SCORE: 17

## 2023-06-21 ASSESSMENT — PATIENT HEALTH QUESTIONNAIRE - PHQ9
10. IF YOU CHECKED OFF ANY PROBLEMS, HOW DIFFICULT HAVE THESE PROBLEMS MADE IT FOR YOU TO DO YOUR WORK, TAKE CARE OF THINGS AT HOME, OR GET ALONG WITH OTHER PEOPLE: EXTREMELY DIFFICULT
SUM OF ALL RESPONSES TO PHQ QUESTIONS 1-9: 21
SUM OF ALL RESPONSES TO PHQ QUESTIONS 1-9: 21

## 2023-06-22 RX ORDER — ESCITALOPRAM OXALATE 20 MG/1
TABLET ORAL
Qty: 30 TABLET | Refills: 5 | Status: SHIPPED | OUTPATIENT
Start: 2023-06-22 | End: 2024-05-01

## 2023-06-22 RX ORDER — BUPROPION HYDROCHLORIDE 300 MG/1
TABLET ORAL
Qty: 30 TABLET | Refills: 5 | Status: SHIPPED | OUTPATIENT
Start: 2023-06-22 | End: 2024-05-01

## 2023-06-22 ASSESSMENT — ANXIETY QUESTIONNAIRES: GAD7 TOTAL SCORE: 17

## 2023-06-22 ASSESSMENT — PATIENT HEALTH QUESTIONNAIRE - PHQ9: SUM OF ALL RESPONSES TO PHQ QUESTIONS 1-9: 21

## 2023-06-22 NOTE — TELEPHONE ENCOUNTER
Provider E-Visit time total (minutes): 10    Over the last 2 weeks, how often have you been bothered by any of the following problems?    1.  Little interest or pleasure in doing things More than half the days   2.  Feeling down, depressed, or hopeless Nearly every day   3.  Trouble falling or staying asleep, or sleeping too much More than half the days   4.  Feeling tired or having little energy Nearly every day   5.  Poor appetite or overeating Nearly every day   6.  Feeling bad about yourself - or that you are a failure or have let yourself or your family down Nearly every day   7.  Trouble concentrating on things, such as reading the newspaper or watching television Nearly every day   8.  Moving or speaking so slowly that other people could have noticed. Or the opposite - being so fidgety or restless that you have been moving around a lot more than usual More than half the days   9.  Thoughts that you would be better off dead, or of hurting yourself in some way Not at all   If you checked off any problems, how difficult have these problems made it for you to do your work, take care of things at home, or get along with other people? Extremely difficult   PHQ9 TOTAL SCORE (range: 0 - 27) 21 (Severe depression)     Fv Mychart E-Visit Depression Anxiety 2    Question 6/21/2023  9:57 PM CDT - Filed by Patient   Is Anxiety one of your symptoms? Yes     Fv Mychart E-Visit Depression Anxiety Jose Roberto-7 ([employer],2002; Used With Permission)    Question 6/21/2023  9:57 PM CDT - Filed by Patient   Over the last two weeks, how often have you been bothered by the following problems?    1. Feeling nervous, anxious, or on edge Nearly every day   2. Not being able to stop or control worrying Nearly every day   3. Worrying too much about different things Nearly every day   4. Trouble relaxing More than half the days   5. Being so restless that it is hard to sit still Several days   6. Becoming easily annoyed or irritable Nearly  every day   7. Feeling afraid, as if something awful might happen More than half the days   How difficult have these problems made it for you to do your work, take care of things at home, or get along with other people? Extremely difficult   BRENDON 7 TOTAL SCORE (range: 0 - 21) 17 (severe anxiety)     Fv Mychart E-Visit Depression Anxiety 3    Question 6/21/2023  9:57 PM CDT - Filed by Patient   Status since last visit: Worse   Significant life event: Yes     Fv Mychart E-Visit Depression Anxiety 4    Question 6/21/2023  9:57 PM CDT - Filed by Patient   Please write a few words describing your life event. 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 Loss of health insurance     Fv Mychart E-Visit Depression Anxiety 5    Question 6/21/2023  9:57 PM CDT - Filed by Patient   Indicate any current substance use. (Alcohol, Marijuana, Cocaine, Heroine, Amphetamine, Other, None) None     Fv Mychart E-Visit Depression Anxiety 7    Question 6/21/2023  9:57 PM CDT - Filed by Patient   Do you experience anxiety attacks or panic attacks? Yes     Fv Mychart E-Visit Depression Anxiety 8    Question 6/21/2023  9:57 PM CDT - Filed by Patient   How often do you have anxiety attacks or panic attacks? (Free text) Once every 6 months or so     Fv Mychart E-Visit Depression Anxiety 9    Question 6/21/2023  9:57 PM CDT - Filed by Patient   How regularly do you take your depression/anxiety medications? 0% - 25%     Fv Mychart E-Visit Depression Anxiety 10    Question 6/21/2023  9:57 PM CDT - Filed by Patient   What side effects does your medication cause? (Auditory, Dry Mouth, Headaches, Impotence, Insomnia, Joint Pain, Limb Pain, Rash, Urinary, Visual, Weight Gain, Weight Loss, Sleepiness, None) None   Wrap up    Anything else you would like to add? Stopped taking medication when i lost health insurance.   Are you pregnant or breastfeeding? I am confident that I am neither       Liliana was seen today for depression and  depression.    Diagnoses and all orders for this visit:    Moderate Depression [296.32]    Moderate major depression (H)  -     escitalopram (LEXAPRO) 20 MG tablet; Take 1 tablet (20 mg total) by mouth daily.  -     buPROPion (WELLBUTRIN XL) 300 MG 24 hr tablet; Take 1 tablet (300 mg total) by mouth every morning.      Will restart pt's previous medications. She plans to follow-up once she has medical insurance. Advise to call if not improving after 6 weeks on the medicine.    Arielle Francisco MD

## 2023-09-29 ASSESSMENT — ANXIETY QUESTIONNAIRES
2. NOT BEING ABLE TO STOP OR CONTROL WORRYING: MORE THAN HALF THE DAYS
GAD7 TOTAL SCORE: 10
4. TROUBLE RELAXING: SEVERAL DAYS
GAD7 TOTAL SCORE: 10
5. BEING SO RESTLESS THAT IT IS HARD TO SIT STILL: NOT AT ALL
6. BECOMING EASILY ANNOYED OR IRRITABLE: MORE THAN HALF THE DAYS
IF YOU CHECKED OFF ANY PROBLEMS ON THIS QUESTIONNAIRE, HOW DIFFICULT HAVE THESE PROBLEMS MADE IT FOR YOU TO DO YOUR WORK, TAKE CARE OF THINGS AT HOME, OR GET ALONG WITH OTHER PEOPLE: EXTREMELY DIFFICULT
7. FEELING AFRAID AS IF SOMETHING AWFUL MIGHT HAPPEN: SEVERAL DAYS
3. WORRYING TOO MUCH ABOUT DIFFERENT THINGS: MORE THAN HALF THE DAYS
1. FEELING NERVOUS, ANXIOUS, OR ON EDGE: MORE THAN HALF THE DAYS

## 2023-09-29 NOTE — COMMUNITY RESOURCES LIST (ENGLISH)
09/29/2023   [facility]  N/A  For questions about this resource list or additional care needs, please contact your primary care clinic or care manager.  Phone: [phone]   Email: N/A   Address: [address] [place] [id number]   Hours: N/A        Food and Nutrition       Food pantry  1  [facility] Distance: 1.37 miles      Pickup   [address] [place] [address]  Language: English, Cypriot, English  Hours: Mon - Fri 12:00 PM - 1:00 PM  Fees: Free   Phone: (481) 314-9742 Email: info@[facility].[employer] Website: https://www.[facility]/locations/_[place]_ca     2  [facility] Distance: 1.39 miles      In-Person, Delivery   [address] [place] [address]  Language: English, English  Hours: Mon - Fri 10:00 AM - 12:00 PM , Mon - Tue 1:30 PM - 3:30 PM , Wed 5:00 PM - 7:00 PM , Thu - Fri 1:30 PM - 3:30 PM  Fees: Free   Phone: (128) 660-9707 Email: info@[facility] Website: https://[facility] Hospit[facility].org/food-shelves/     SNAP application assistance  3  [employer] [facility] Distance: 1.76 miles      Phone/Virtual   [address] [place] [address]  Language: English  Hours: Mon - Fri 8:00 AM - 4:30 PM  Fees: Free   Phone: (679) 732-8220 Email: info@caprw.org Website: http://www.caprw.org/     4  [facility] - SNAP Ed - SNAP Ed Distance: 3.26 miles      In-Person   [address] [place] [address]  Language: English, Hmong, Oromo, Cypriot, English  Hours: Mon - Fri 9:00 AM - 5:00 PM Appt. Only  Fees: Free   Phone: (620) 329-3622 Email: mnanit@[facility] Website: https://extension.[facility]/nutrition-education/snap-ed-educational-offerings     Soup kitchen or free meals  5  [facility] Distance: 1.32 miles      [address] [address]  Language: English  Hours: Mon 12:00 PM - 2:00 PM , Wed 12:00  PM - 2:00 PM  Fees: Free   Phone: (194) 600-8629 Ext.4 Email: info@[employer] Website: http://www.[employer]     6  [facility] - Free Summer Meals Distance: 1.83 miles      In-Person   [address] [address]  Language: English, Hmong, Equatorial Guinean  Hours: Mon - Fri 12:00 PM - 1:00 PM , Mon - Fri 3:00 PM - 4:00 PM  Fees: Free   Phone: (666) 163-4037 Email: Breanna@.[facility]. Website: https://www.[facility]/departments/sanchez-recreation/[facility]          Transportation       Free or low-cost transportation  7  [facility] Distance: 2.08 miles      In-Person   [address] [place] [address]  Language: English, Equatorial Guinean  Hours: Mon 9:00 AM - 5:00 PM , Tue 9:30 AM - 7:00 PM , Wed 9:00 AM - 5:00 PM , Thu 9:30 AM - 7:00 PM , Fri 9:00 AM - 5:00 PM  Fees: Free   Phone: (708) 596-3980 Email: shanita@[employer] Website: http://www.[employer]     8  [facility] Distance: 4.14 miles      In-Person   [address] [place] [address]  Language: English  Hours: Mon - Fri 8:00 AM - 3:00 PM  Fees: Free   Phone: (933) 513-2044 Ext.14 Email: polly@[facility].[employer] Website: http://www.[facility].org     Transportation to medical appointments  9  [facility] - Non-Emergency Medical Transportation Distance: 3.5 miles      In-Person   [address] [place] [address]  Language: English  Hours: Mon - Fri 8:00 AM - 4:00 PM Appt. Only  Fees: Self Pay   Phone: (872) 249-8865 Website: http://www.[facility]health.org/Medical-Services/Emergency-medical-services/Non-emergency-transportation/     10  [facility] Distance: 5.03 miles      In-Person   [address] [place] [address]  Language: English, Equatorial Guinean  Hours: Mon - Sun Appt. Only  Fees: Self Pay   Phone: (562) 776-4908 Email: renato@[employer] Website:  http://www.assistedtransportmn.com/          Important Numbers & Websites       Emergency Services   911  [facility] Services   311  Poison Control   (225) 745-6199  Suicide Prevention Lifeline   (881) 610-4588 (TALK)  Child Abuse Hotline   (675) 689-9584 (4-A-Child)  Sexual Assault Hotline   (943) 426-8397 (HOPE)  National Runaway Safeline   (614) 766-3989 (RUNAWAY)  All-Options Talkline   (412) 854-1207  Substance Abuse Referral   (512) 909-2976 (HELP)

## 2023-10-02 ENCOUNTER — OFFICE VISIT (OUTPATIENT)
Dept: FAMILY MEDICINE | Facility: CLINIC | Age: 28
End: 2023-10-02

## 2023-10-02 VITALS
WEIGHT: 271.5 LBS | HEART RATE: 93 BPM | DIASTOLIC BLOOD PRESSURE: 84 MMHG | TEMPERATURE: 98 F | HEIGHT: 70 IN | SYSTOLIC BLOOD PRESSURE: 128 MMHG | OXYGEN SATURATION: 97 % | BODY MASS INDEX: 38.87 KG/M2 | RESPIRATION RATE: 15 BRPM

## 2023-10-02 DIAGNOSIS — F41.9 ANXIETY: ICD-10-CM

## 2023-10-02 DIAGNOSIS — F32.2 CURRENT SEVERE EPISODE OF MAJOR DEPRESSIVE DISORDER WITHOUT PSYCHOTIC FEATURES, UNSPECIFIED WHETHER RECURRENT (H): Primary | ICD-10-CM

## 2023-10-02 PROCEDURE — 99214 OFFICE O/P EST MOD 30 MIN: CPT | Performed by: FAMILY MEDICINE

## 2023-10-02 RX ORDER — BUPROPION HYDROCHLORIDE 300 MG/1
300 TABLET ORAL EVERY MORNING
Qty: 90 TABLET | Refills: 3 | Status: SHIPPED | OUTPATIENT
Start: 2023-10-02

## 2023-10-02 ASSESSMENT — PATIENT HEALTH QUESTIONNAIRE - PHQ9
SUM OF ALL RESPONSES TO PHQ QUESTIONS 1-9: 20
SUM OF ALL RESPONSES TO PHQ QUESTIONS 1-9: 20
10. IF YOU CHECKED OFF ANY PROBLEMS, HOW DIFFICULT HAVE THESE PROBLEMS MADE IT FOR YOU TO DO YOUR WORK, TAKE CARE OF THINGS AT HOME, OR GET ALONG WITH OTHER PEOPLE: EXTREMELY DIFFICULT

## 2023-10-02 ASSESSMENT — PAIN SCALES - GENERAL: PAINLEVEL: NO PAIN (0)

## 2023-10-02 NOTE — PROGRESS NOTES
Assessment/ Plan  1. Current severe episode of major depressive disorder without psychotic features, unspecified whether recurrent (H)    - sertraline (ZOLOFT) 50 MG tablet; Take 1 tablet (50 mg) by mouth daily  Dispense: 90 tablet; Refill: 3  - buPROPion (WELLBUTRIN XL) 300 MG 24 hr tablet; Take 1 tablet (300 mg) by mouth every morning  Dispense: 90 tablet; Refill: 3    2. Anxiety  For both of the above problems:  Start bupropion  which has been beneficial for the patient since 2015.  Use primarily for depression.  Instead of restarting Lexapro which has not seemed effective for the patient, sertraline.  50 mg.  Virtual visit follow-up in 4 weeks.  Patient is engaged to be  but does not plan to try to conceive for over a year.  Discussed risk of these medications with early pregnancy.  Discussed potential side effects.  She has seen a couple of therapist in the past, does not have health insurance currently and declined referral to therapist.    Given cost, will try to get the flu shot and COVID shot at pharmacy.  Body mass index is 38.96 kg/m .    Subjective  CC:  chief complaint  HPI:  28-year-old  History of anxiety, depression and ADHD  Has been amount of medical insurance and primary care provider, Dr. Francisco, recently retired.  Ran out of medications about 1 month ago.  Was on Lexapro 20 which she does not think ever really helped.  Has been on bupropion  for a longer time which initially helped quite a bit.  Just not enough.  Lexapro pro added more for anxiety than depression.    Was on and off her medications before this as well.    Had some big life changes lately.  Dog was stolen but she discussed her back.  Father has dementia, she is quit her job and help for him.  She lost her insurance when she quit her job.  She is engaged to be .      Patient Active Problem List   Diagnosis    Heartburn    Anxiety    Moderate major depression (H)     Current medications reviewed as  "follows:  buPROPion (WELLBUTRIN XL) 300 MG 24 hr tablet, Take 1 tablet (300 mg total) by mouth every morning. (Patient not taking: Reported on 10/2/2023)  escitalopram (LEXAPRO) 20 MG tablet, Take 1 tablet (20 mg total) by mouth daily. (Patient not taking: Reported on 10/2/2023)    No current facility-administered medications on file prior to visit.     History   Smoking Status    Never   Smokeless Tobacco    Never     Social History     Social History Narrative    Not on file     Patient Care Team:  Wilian Villagomez MD as PCP - General (Family Medicine)  Arielle Francisco MD as Assigned PCP      Objective  Physical Exam  Vitals:    10/02/23 1324   BP: 128/84   BP Location: Left arm   Patient Position: Sitting   Cuff Size: Adult Large   Pulse: 93   Resp: 15   Temp: 98  F (36.7  C)   SpO2: 97%   Weight: 123.2 kg (271 lb 8 oz)   Height: 1.778 m (5' 10\")     Alert, pleasant, engaging, no acute distress.  Normal and speech content.  Diagnostics  PHQ 9= 20    5 to 9: mild   10 to 14: moderate   15 to 19: moderately severe   ?20: severe     BRENDON-7 is 10    Please note: Voice recognition software was used in this dictation.  It may therefore contain typographical errors.    Prior to immunization administration, verified patients identity using patient s name and date of birth. Please see Immunization Activity for additional information.     Screening Questionnaire for Adult Immunization    Are you sick today?   No   Do you have allergies to medications, food, a vaccine component or latex?   No   Have you ever had a serious reaction after receiving a vaccination?   No   Do you have a long-term health problem with heart, lung, kidney, or metabolic disease (e.g., diabetes), asthma, a blood disorder, no spleen, complement component deficiency, a cochlear implant, or a spinal fluid leak?  Are you on long-term aspirin therapy?   No   Do you have cancer, leukemia, HIV/AIDS, or any other immune system problem?   No   Do you " have a parent, brother, or sister with an immune system problem?   No   In the past 3 months, have you taken medications that affect  your immune system, such as prednisone, other steroids, or anticancer drugs; drugs for the treatment of rheumatoid arthritis, Crohn s disease, or psoriasis; or have you had radiation treatments?   No   Have you had a seizure, or a brain or other nervous system problem?   No   During the past year, have you received a transfusion of blood or blood    products, or been given immune (gamma) globulin or antiviral drug?   No   For women: Are you pregnant or is there a chance you could become       pregnant during the next month?   No   Have you received any vaccinations in the past 4 weeks?   No     Immunization questionnaire answers were all negative.      Patient instructed to remain in clinic for 15 minutes afterwards, and to report any adverse reactions.     Screening performed by Rita Diaz MA on 10/2/2023 at 1:28 PM.    Answers submitted by the patient for this visit:  Patient Health Questionnaire (Submitted on 10/2/2023)  If you checked off any problems, how difficult have these problems made it for you to do your work, take care of things at home, or get along with other people?: Extremely difficult  PHQ9 TOTAL SCORE: 20  BRENDON-7 (Submitted on 9/29/2023)  BRENDON 7 TOTAL SCORE: 10  Depression / Anxiety Questionnaire (Submitted on 9/29/2023)  Chief Complaint: Chronic problems general questions HPI Form  Depression/Anxiety: Depression & Anxiety  Depression & Anxiety (Submitted on 9/29/2023)  Chief Complaint: Chronic problems general questions HPI Form  Status since last visit:: worse  Anxiety since last: : worse  Other associated symptoms of depression:: No  Other associated symotome: : No  Significant life event: : relationship concerns, job concerns, financial concerns  Anxious:: Yes  Current substance use:: No  General Questionnaire (Submitted on 9/29/2023)  Chief Complaint: Chronic  problems general questions HPI Form  How many servings of fruits and vegetables do you eat daily?: 0-1  On average, how many sweetened beverages do you drink each day (Examples: soda, juice, sweet tea, etc.  Do NOT count diet or artificially sweetened beverages)?: 1  How many minutes a day do you exercise enough to make your heart beat faster?: 10 to 19  How many days a week do you exercise enough to make your heart beat faster?: 3 or less  How many days per week do you miss taking your medication?: 7  What makes it hard for you to take your medication every day?: cost of medication

## 2023-10-02 NOTE — COMMUNITY RESOURCES LIST (ENGLISH)
10/02/2023   [facility] - Outpatient Clinics  N/A  For additional resource needs, please contact your health insurance member services or your primary care team.  Phone: [phone]   Email: N/A   Address: [address] [place] [address]   Hours: N/A        Food and Nutrition       Food pantry  1  [facility] Distance: 1.37 miles      Pickup   [address] [place] [address]  Language: English, Fijian, Albanian  Hours: Mon - Fri 12:00 PM - 1:00 PM  Fees: Free   Phone: (361) 816-7504 Email: info@[facility].[employer] Website: https://www.[facility].org/locations/_[place]_[facility]     2  [facility] Distance: 1.39 miles      In-Person, Delivery   [address] [place] [address]  Language: English, Albanian  Hours: Mon - Fri 10:00 AM - 12:00 PM , Mon - Tue 1:30 PM - 3:30 PM , Wed 5:00 PM - 7:00 PM , Thu - Fri 1:30 PM - 3:30 PM  Fees: Free   Phone: (536) 433-5719 Email: info@[employer] Website: https://[employer].org/food-shelves/     SNAP application assistance  3  [employer] [place] Distance: 3.95 miles      Phone/Virtual   [address] [facility] [address] [place] [address]  Language: English, Hmong, Montserratian, Fijian, Albanian  Hours: Mon - Fri 8:30 AM - 4:30 PM  Fees: Free   Phone: (692) 150-2218 Email: helpline@hungersolutions.org Website: https://www.hungersolutions.org/programs/[place]-food-helpline/     4  Community [employer] Partnership (CAP) [facility] Distance: 1.76 miles      Phone/Virtual   [address] [place] [address]  Language: English  Hours: Mon - Fri 8:00 AM - 4:30 PM  Fees: Free   Phone: (361) 896-3822 Email: info@caprw.org Website: http://www.caprw.org/     Soup kitchen or free meals  5  [employer] [place] Distance: 1.32 miles      [address] [address]  Language: English  Hours: Mon 12:00 PM - 2:00 PM , Wed 12:00 PM - 2:00 PM  Fees: Free    Phone: (560) 702-4178 Ext.4 Email: info@[employer]ERE Website: http://www.[employer].IDEA SPHERE     6  [facility] [place] [facility] - Free Summer Meals Distance: 1.83 miles      In-Person   [address] [place] [address]  Language: English, Hmong, Guyanese  Hours: Mon - Fri 12:00 PM - 1:00 PM , Mon - Fri 3:00 PM - 4:00 PM  Fees: Free   Phone: (489) 675-3889 Email: Breanna@.[facility]. Website: https://www.[facility] HospitalEurotechnology JapanTallahas[facility]/departments/sanchez-recreation/[place]-Welcome          Transportation       Free or low-cost transportation  7  [employer] Distance: 2.08 miles      In-Person   [address] [place] [address]  Language: English, Guyanese  Hours: Mon 9:00 AM - 5:00 PM , Tue 9:30 AM - 7:00 PM , Wed 9:00 AM - 5:00 PM , Thu 9:30 AM - 7:00 PM , Fri 9:00 AM - 5:00 PM  Fees: Free   Phone: (834) 577-3685 Email: contactus@[employer] Website: http://www.K[employer]o     8  [facility] Distance: 4.14 miles      In-Person   [address] [place] [address]  Language: English  Hours: Mon - Fri 8:00 AM - 3:00 PM  Fees: Free   Phone: (383) 889-3546 Ext.14 Email: neighborhood@[facility].[employer] Website: http://www.[facility] HospitalB[facility]ade[facility].org     Transportation to medical appointments  9  [facility] - Non-Emergency Medical Transportation Distance: 3.5 miles      In-Person   [address] [place] [address]  Language: English  Hours: Mon - Fri 8:00 AM - 4:00 PM Appt. Only  Fees: Self Pay   Phone: (414) 209-6024 Website: http://www.[facility].org/Medical-Services/Emergency-medical-services/Non-emergency-transportation/     10  Assisted Transport Distance: 5.03 miles      In-Person   [address] [place] [address]  Language: English, Guyanese  Hours: Mon - Sun Appt. Only  Fees: Self Pay   Phone: (625) 207-7236 Email: renato@[employer] Website: http://www.Droplet/          Important  Numbers & Websites       [facility]   211 211unitedway.org  Poison Control   (663) 411-4164 [place]poison.[place]  Suicide and Crisis Lifeline   983 988lifeline.org  Childhelp [place] Child Abuse Hotline   469.333.8570 Childhelphotline.org  [place] Sexual Assault Hotline   (347) 850-9959 (HOPE) [facility] Runaway Safeline   (291) 587-9939 (RUNAWAY) [facility].org  Pregnancy & Postpartum Support [place]   Call/text 658-140-6865 Ppsupportmn.org  Substance Abuse National Helpline ([facility]   267-776-HELP (0663) Findtreatment.gov  Emergency Services   915

## 2023-10-02 NOTE — COMMUNITY RESOURCES LIST (ENGLISH)
10/02/2023   [facility]  N/A  For questions about this resource list or additional care needs, please contact your primary care clinic or care manager.  Phone: [phone]   Email: N/A   Address: [address] [place] [address]   Hours: N/A        Food and Nutrition       Food pantry  1  [facility] Distance: 1.37 miles      Pickup   [address] [place] [address]  Language: English, Dutch, Samoan  Hours: Mon - Fri 12:00 PM - 1:00 PM  Fees: Free   Phone: (857) 656-8524 Email: info@[facility].[employer] Website: https://www.[facility] Center[facility] Insiders/locations/_[place]_ca     2  [facility] Food Shelf Distance: 1.39 miles      In-Person, Delivery   [address] [place] [address]  Language: English, Samoan  Hours: Mon - Fri 10:00 AM - 12:00 PM , Mon - Tue 1:30 PM - 3:30 PM , Wed 5:00 PM - 7:00 PM , Thu - Fri 1:30 PM - 3:30 PM  Fees: Free   Phone: (452) 737-7853 Email: info@[facility] CenterAmbroniteLiveBuzz.Sagge Website: https://[facility]deCafesM[facility] CenterAmbroniteLiveBuzz.org/food-shelves/     SNAP application assistance  3  [employer] ([facility] Distance: 1.76 miles      Phone/Virtual   [address] [facility] [address] [place] [address]  Language: English  Hours: Mon - Fri 8:00 AM - 4:30 PM  Fees: Free   Phone: (623) 211-3211 Email: info@caprw.org Website: http://www.caprw.org/     4  [facility] Extension - SNAP Ed - SNAP Ed Distance: 3.26 miles      In-Person   [address] [place] [address]  Language: English, Hmong, Oromo, Dutch, Samoan  Hours: Mon - Fri 9:00 AM - 5:00 PM Appt. Only  Fees: Free   Phone: (434) 869-4266 Email: mnanit@[facility] Website: https://extension.[facility]/nutrition-education/snap-ed-educational-offerings     Soup kitchen or free meals  5  [employer] [place] Distance: 1.32 miles      [address] [address]  Language: English  Hours: Mon 12:00 PM - 2:00 PM , Wed 12:00  PM - 2:00 PM  Fees: Free   Phone: (238) 362-8729 Ext.4 Email: info@[employer] Website: http://www.[employer]     6  [facility] [place] [facility] - Free Summer Meals Distance: 1.83 miles      In-Person   [address] [place] [address]  Language: English, Hmong, Belarusian  Hours: Mon - Fri 12:00 PM - 1:00 PM , Mon - Fri 3:00 PM - 4:00 PM  Fees: Free   Phone: (139) 119-1613 Email: Breanna@.[facility]. Website: https://www.[facility] IslandModumetalH[facility]/departments/sanchez-recreation/[place]          Transportation       Free or low-cost transportation  7  [facility] Distance: 2.08 miles      In-Person   [address] [place] [address]  Language: English, Belarusian  Hours: Mon 9:00 AM - 5:00 PM , Tue 9:30 AM - 7:00 PM , Wed 9:00 AM - 5:00 PM , Thu 9:30 AM - 7:00 PM , Fri 9:00 AM - 5:00 PM  Fees: Free   Phone: (954) 632-3239 Email: shanita@[employer] Website: http://www.HitchedPic     8  [facility] Distance: 4.14 miles      In-Person   [address] [place] [address]  Language: English  Hours: Mon - Fri 8:00 AM - 3:00 PM  Fees: Free   Phone: (157) 477-3845 Ext.14 Email: polly@[facility].[employer] Website: http://www.[facility]form[facility]onlineBar[facility] Citizens Hospital.org     Transportation to medical appointments  9  [facility] - Non-Emergency Medical Transportation Distance: 3.5 miles      In-Person   [address] [place] [address]  Language: English  Hours: Mon - Fri 8:00 AM - 4:00 PM Appt. Only  Fees: Self Pay   Phone: (967) 544-5561 Website: http://www.allMophiehealth.org/Medical-Services/Emergency-medical-services/Non-emergency-transportation/     10  Assisted Transport Distance: 5.03 miles      In-Person   [address] [address] [place] [address]  Language: English, Belarusian  Hours: Mon - Sun Appt. Only  Fees: Self Pay   Phone: (639) 540-4375 Email: renato@[employer]online Website:  http://www.assistedtransportmn.com/          Important Numbers & Websites       Emergency Services   911  [facility] Services   311  Poison Control   (361) 790-9653  Suicide Prevention Lifeline   (834) 376-9195 (TALK)  Child Abuse Hotline   (542) 414-3910 (4-A-Child)  Sexual Assault Hotline   (527) 372-3240 (HOPE)  National Runaway Safeline   (664) 764-6224 (RUNAWAY)  All-Options Talkline   (569) 318-3505  Substance Abuse Referral   (645) 296-4670 (HELP)

## 2024-04-01 ENCOUNTER — MYC REFILL (OUTPATIENT)
Dept: FAMILY MEDICINE | Facility: CLINIC | Age: 29
End: 2024-04-01
Payer: COMMERCIAL

## 2024-04-01 DIAGNOSIS — F90.0 ATTENTION DEFICIT HYPERACTIVITY DISORDER (ADHD), PREDOMINANTLY INATTENTIVE TYPE: ICD-10-CM

## 2024-04-01 RX ORDER — DEXTROAMPHETAMINE SACCHARATE, AMPHETAMINE ASPARTATE MONOHYDRATE, DEXTROAMPHETAMINE SULFATE AND AMPHETAMINE SULFATE 2.5; 2.5; 2.5; 2.5 MG/1; MG/1; MG/1; MG/1
CAPSULE, EXTENDED RELEASE ORAL
Qty: 90 CAPSULE | Refills: 0 | Status: SHIPPED | OUTPATIENT
Start: 2024-04-01 | End: 2024-05-01

## 2024-04-01 RX ORDER — DEXTROAMPHETAMINE SACCHARATE, AMPHETAMINE ASPARTATE MONOHYDRATE, DEXTROAMPHETAMINE SULFATE AND AMPHETAMINE SULFATE 7.5; 7.5; 7.5; 7.5 MG/1; MG/1; MG/1; MG/1
30 CAPSULE, EXTENDED RELEASE ORAL DAILY
Qty: 90 CAPSULE | Refills: 0 | Status: SHIPPED | OUTPATIENT
Start: 2024-04-01 | End: 2024-05-01

## 2024-04-09 ENCOUNTER — MYC REFILL (OUTPATIENT)
Dept: FAMILY MEDICINE | Facility: CLINIC | Age: 29
End: 2024-04-09
Payer: COMMERCIAL

## 2024-04-09 DIAGNOSIS — F90.0 ATTENTION DEFICIT HYPERACTIVITY DISORDER (ADHD), PREDOMINANTLY INATTENTIVE TYPE: ICD-10-CM

## 2024-04-09 RX ORDER — DEXTROAMPHETAMINE SACCHARATE, AMPHETAMINE ASPARTATE MONOHYDRATE, DEXTROAMPHETAMINE SULFATE AND AMPHETAMINE SULFATE 2.5; 2.5; 2.5; 2.5 MG/1; MG/1; MG/1; MG/1
CAPSULE, EXTENDED RELEASE ORAL
Qty: 90 CAPSULE | Refills: 0 | Status: CANCELLED | OUTPATIENT
Start: 2024-04-09

## 2024-04-09 RX ORDER — DEXTROAMPHETAMINE SACCHARATE, AMPHETAMINE ASPARTATE MONOHYDRATE, DEXTROAMPHETAMINE SULFATE AND AMPHETAMINE SULFATE 7.5; 7.5; 7.5; 7.5 MG/1; MG/1; MG/1; MG/1
30 CAPSULE, EXTENDED RELEASE ORAL DAILY
Qty: 90 CAPSULE | Refills: 0 | Status: CANCELLED | OUTPATIENT
Start: 2024-04-09

## 2024-05-01 ENCOUNTER — OFFICE VISIT (OUTPATIENT)
Dept: FAMILY MEDICINE | Facility: CLINIC | Age: 29
End: 2024-05-01
Payer: COMMERCIAL

## 2024-05-01 VITALS
OXYGEN SATURATION: 96 % | DIASTOLIC BLOOD PRESSURE: 88 MMHG | HEIGHT: 71 IN | WEIGHT: 259 LBS | SYSTOLIC BLOOD PRESSURE: 122 MMHG | BODY MASS INDEX: 36.26 KG/M2 | HEART RATE: 91 BPM | RESPIRATION RATE: 14 BRPM | TEMPERATURE: 97.1 F

## 2024-05-01 DIAGNOSIS — F32.4 MAJOR DEPRESSIVE DISORDER IN PARTIAL REMISSION, UNSPECIFIED WHETHER RECURRENT (H): ICD-10-CM

## 2024-05-01 DIAGNOSIS — T78.2XXA ANAPHYLAXIS, INITIAL ENCOUNTER: ICD-10-CM

## 2024-05-01 DIAGNOSIS — F90.0 ATTENTION DEFICIT HYPERACTIVITY DISORDER (ADHD), PREDOMINANTLY INATTENTIVE TYPE: Primary | ICD-10-CM

## 2024-05-01 DIAGNOSIS — Z00.00 HEALTHCARE MAINTENANCE: ICD-10-CM

## 2024-05-01 PROCEDURE — 86003 ALLG SPEC IGE CRUDE XTRC EA: CPT | Performed by: FAMILY MEDICINE

## 2024-05-01 PROCEDURE — 99214 OFFICE O/P EST MOD 30 MIN: CPT | Mod: 25 | Performed by: FAMILY MEDICINE

## 2024-05-01 PROCEDURE — 36415 COLL VENOUS BLD VENIPUNCTURE: CPT | Performed by: FAMILY MEDICINE

## 2024-05-01 PROCEDURE — 90480 ADMN SARSCOV2 VAC 1/ONLY CMP: CPT | Performed by: FAMILY MEDICINE

## 2024-05-01 PROCEDURE — 91320 SARSCV2 VAC 30MCG TRS-SUC IM: CPT | Performed by: FAMILY MEDICINE

## 2024-05-01 RX ORDER — EPINEPHRINE 0.3 MG/.3ML
0.3 INJECTION SUBCUTANEOUS PRN
Qty: 2 EACH | Refills: 1 | Status: SHIPPED | OUTPATIENT
Start: 2024-05-01

## 2024-05-01 RX ORDER — SERTRALINE HYDROCHLORIDE 100 MG/1
100 TABLET, FILM COATED ORAL DAILY
Qty: 90 TABLET | Refills: 3 | Status: SHIPPED | OUTPATIENT
Start: 2024-05-01

## 2024-05-01 ASSESSMENT — PATIENT HEALTH QUESTIONNAIRE - PHQ9
SUM OF ALL RESPONSES TO PHQ QUESTIONS 1-9: 9
10. IF YOU CHECKED OFF ANY PROBLEMS, HOW DIFFICULT HAVE THESE PROBLEMS MADE IT FOR YOU TO DO YOUR WORK, TAKE CARE OF THINGS AT HOME, OR GET ALONG WITH OTHER PEOPLE: SOMEWHAT DIFFICULT
SUM OF ALL RESPONSES TO PHQ QUESTIONS 1-9: 9

## 2024-05-01 NOTE — LETTER
[facility]  05/01/24  Patient: Liliana Henley  YOB: 1995  Medical Record Number: [id number]                                                                                  Non-Opioid Controlled Substance Agreement    This is an agreement between you and your provider regarding safe and appropriate use of controlled substances prescribed by your care team. Controlled substances are?medicines that can cause physical and mental dependence (abuse).     There are strict laws about having and using these medicines. We here at [facility] are  committed to working with you in your efforts to get better. To support you in this work, we'll help you schedule regular office appointments for medicine refills. If we must cancel or change your appointment for any reason, we'll make sure you have enough medicine to last until your next appointment.     As a Provider, I will:   Listen carefully to your concerns while treating you with respect.   Recommend a treatment plan that I believe is in your best interest and may involve therapies other than medicine.    Talk with you often about the possible benefits and the risk of harm of any medicine that we prescribe for you.  Assess the safety of this medicine and check how well it works.    Provide a plan on how to taper (discontinue or go off) using this medicine if the decision is made to stop its use.      ::  As a Patient, I understand controlled substances:     Are prescribed by my care provider to help me function or work and manage my condition(s).?  Are strong medicines and can cause serious side effects.     Need to be taken exactly as prescribed.?Combining controlled substances with certain medicines or chemicals (such as illegal drugs, alcohol, sedatives, sleeping pills, and benzodiazepines) can be dangerous or even fatal.? If I stop taking my medicines suddenly, I may have severe withdrawal symptoms.     The risks, benefits,  and side effects of these medicine(s) were explained to me. I agree that:    I will take part in other treatments as advised by my care team. This may be psychiatry or counseling, physical therapy, behavioral therapy, group treatment or a referral to specialist.    I will keep all my appointments and understand this is part of the monitoring of controlled substances.?My care team may require an office visit for EVERY controlled substance refill. If I miss appointments or don t follow instructions, my care team may stop my medicine    I will take my medicines as prescribed. I will not change the dose or schedule unless my care team tells me to. There will be no refills if I run out early.      I may be asked to come to the clinic and complete a urine drug test or complete a pill count. If I don t give a urine sample or participate in a pill count, the care team may stop my medicine.    I will only receive controlled substance prescriptions from this clinic. If I am treated by another provider, I will tell them that I am taking controlled substances and that I have a treatment agreement with this provider. I will inform my [facility] care team within one business day if I am given a prescription for any controlled substance by another healthcare provider. My [facility] care team can contact other providers and pharmacists about my use of any medicines.    It is up to me to make sure that I don't run out of my medicines on weekends or holidays.?If my care team is willing to refill my prescription without a visit, I must request refills only during office hours. Refills may take up to 3 business days to process. I will use one pharmacy to fill all my controlled substance prescriptions. I will notify the clinic about any changes to my insurance or medicine availability.    I am responsible for my prescriptions. If the medicine/prescription is lost, stolen or destroyed, it will not be replaced.?I also agree  not to share controlled substance medicines with anyone.     I am aware I should not use any illegal or recreational drugs. I agree not to drink alcohol unless my care team says I can.     If I enroll in the [place] Medical Cannabis program, I will tell my care team before my next refill.    I will tell my care team right away if I become pregnant, have a new medical problem treated outside of my regular clinic, or have a change in my medicines.     I understand that this medicine can affect my thinking, judgment and reaction time.? Alcohol and drugs affect the brain and body, which can affect the safety of my driving. Being under the influence of alcohol or drugs can affect my decision-making, behaviors, personal safety and the safety of others. Driving while impaired (DWI) can occur if a person is driving, operating or in physical control of a car, motorcycle, boat, snowmobile, ATV, motorbike, off-road vehicle or any other motor vehicle ([place] Statute 169A.20). I understand the risk if I choose to drive or operate any vehicle or machinery.    I understand that if I do not follow any of the conditions above, my prescriptions or treatment may be stopped or changed.   I agree that my provider, clinic care team and pharmacy may work with any city, state or federal law enforcement agency that investigates the misuse, sale or other diversion of my controlled medicine. I will allow my provider to discuss my care with, or share a copy of, this agreement with any other treating provider, pharmacy or emergency room where I receive care.     I have read this agreement and have asked questions about anything I did not understand.    ________________________________________________________  Patient Signature - Liliana Henley     ___________________                   Date     ________________________________________________________  Provider Signature - Wilian Villagomez MD       ___________________                   Date      ________________________________________________________  Witness Signature (required if provider not present while patient signing)          ___________________                   Date

## 2024-05-01 NOTE — ASSESSMENT & PLAN NOTE
Patient recounts a number of for sowed's, at least for 5, where she has eaten something, then gets swelling in her face and dizziness.  Never has had wheezing or problems breathing.  She thinks she is allergic to tree nuts, sunflower seeds, sesame seeds and grapes.    She does not carry an EpiPen.    Will  Order an EpiPen  Test for tree nuts, sunflower seeds, sesame seeds.  I cannot find allergy to grapes.  Avoid these  Follow-up with allergist.

## 2024-05-01 NOTE — ASSESSMENT & PLAN NOTE
10/2/2023 visit, Lexapro changed to sertraline 50.  PHQ-9 went from 20-9.  No side effects  Will increase sertraline to 100 mg.  Follow-up at time of physical in July.

## 2024-05-01 NOTE — ASSESSMENT & PLAN NOTE
Patient is decided that she does not want to continue stimulant medication at this time.  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

## 2024-05-01 NOTE — PROGRESS NOTES
Assessment/ Plan  Moderate major depression (H)  10/2/2023 visit, Lexapro changed to sertraline 50.  PHQ-9 went from 20-9.  No side effects  Will increase sertraline to 100 mg.  Follow-up at time of physical in July.    Anaphylaxis, initial encounter  Patient recounts a number of for sowed's, at least for 5, where she has eaten something, then gets swelling in her face and dizziness.  Never has had wheezing or problems breathing.  She thinks she is allergic to tree nuts, sunflower seeds, sesame seeds and grapes.    She does not carry an EpiPen.    Will  Order an EpiPen  Test for tree nuts, sunflower seeds, sesame seeds.  I cannot find allergy to grapes.  Avoid these  Follow-up with allergist.    Healthcare maintenance  COVID-vaccine   Subjective  CC:  chief complaint  HPI:  29-year-old, here for follow-up on ADHD.  But has decided she does not want to continue medication.  See discussion above.    Depression is somewhat better.  No significant side effects with medication.    Discussed spells that seem like anaphylaxis, never any breathing problems but dizziness and significant swelling of face and lips.  Seems to come after eating certain foods.  PFSH:  Patient Active Problem List   Diagnosis    Heartburn    Anxiety    Moderate major depression (H)    Anaphylaxis, initial encounter    Healthcare maintenance     Current Outpatient Medications   Medication Sig Dispense Refill    amphetamine-dextroamphetamine (ADDERALL XR) 10 MG 24 hr capsule [DEXTROAMPHETAMINE-AMPHETAMINE (ADDERALL XR) 10 MG 24 HR CAPSULE] Take 1 po in am with 30 mg tablet for total dose of 40 mg 90 capsule 0    buPROPion (WELLBUTRIN XL) 300 MG 24 hr tablet Take 1 tablet (300 mg) by mouth every morning 90 tablet 3    EPINEPHrine (ANY BX GENERIC EQUIV) 0.3 MG/0.3ML injection 2-pack Inject 0.3 mLs (0.3 mg) into the muscle as needed for anaphylaxis May repeat one time in 5-15 minutes if response to initial dose is inadequate. 2 each 1    sertraline  "(ZOLOFT) 100 MG tablet Take 1 tablet (100 mg) by mouth daily 90 tablet 3    sertraline (ZOLOFT) 50 MG tablet Take 1 tablet (50 mg) by mouth daily 90 tablet 3    amphetamine-dextroamphetamine (ADDERALL XR) 30 MG 24 hr capsule Take 1 capsule (30 mg) by mouth daily (Patient not taking: Reported on 5/1/2024) 90 capsule 0     No current facility-administered medications for this visit.        History   Smoking Status    Never   Smokeless Tobacco    Never     Social History     Social History Narrative    Not on file     Patient Care Team:  Wilian Villagomez MD as PCP - General (Family Medicine)  Wilian Villagomez MD as Assigned PCP        Objective  Physical Exam  Vitals:    05/01/24 0939   BP: 122/88   Pulse: 91   Resp: 14   Temp: 97.1  F (36.2  C)   TempSrc: Tympanic   SpO2: 96%   Weight: 117.5 kg (259 lb)   Height: 1.795 m (5' 10.67\")     Body mass index is 36.46 kg/m .  Patient is pleasant, alert, oriented.  Diagnostics:   Very pleasant, alert, oriented, no distress.      Please note: Voice recognition software was used in this dictation.  It may therefore contain typographical errors.    A total of 30 minutes was spent on this visit reviewing previous notes, counseling patient, ordering tests , adjusting meds (see below) and documenting the findings in this note    Answers submitted by the patient for this visit:  Patient Health Questionnaire (Submitted on 5/1/2024)  If you checked off any problems, how difficult have these problems made it for you to do your work, take care of things at home, or get along with other people?: Somewhat difficult  PHQ9 TOTAL SCORE: 9  Depression / Anxiety Questionnaire (Submitted on 5/1/2024)  Chief Complaint: Chronic problems general questions HPI Form  Depression/Anxiety: Depression  Depression only (Submitted on 5/1/2024)  Chief Complaint: Chronic problems general questions HPI Form  Status since last visit:: medium  Other associated symptoms of depression:: No  Significant life event: " : job concerns, financial concerns  Anxious:: Yes  Current substance use:: No  General Questionnaire (Submitted on 5/1/2024)  Chief Complaint: Chronic problems general questions HPI Form  How many servings of fruits and vegetables do you eat daily?: 0-1  On average, how many sweetened beverages do you drink each day (Examples: soda, juice, sweet tea, etc.  Do NOT count diet or artificially sweetened beverages)?: 1  How many minutes a day do you exercise enough to make your heart beat faster?: 20 to 29  How many days a week do you exercise enough to make your heart beat faster?: 4  How many days per week do you miss taking your medication?: 0

## 2024-05-06 LAB
ALMOND IGE QN: 0.44 KU(A)/L
CASHEW NUT IGE QN: <0.1 KU(A)/L
SESAME SEED IGE QN: 0.83 KU(A)/L
SUNFLOWER SEED IGE QN: 0.61 KU(A)/L

## 2024-05-23 ENCOUNTER — TELEPHONE (OUTPATIENT)
Dept: FAMILY MEDICINE | Facility: CLINIC | Age: 29
End: 2024-05-23
Payer: COMMERCIAL

## 2024-07-20 ENCOUNTER — HEALTH MAINTENANCE LETTER (OUTPATIENT)
Age: 29
End: 2024-07-20

## 2024-09-19 ENCOUNTER — OFFICE VISIT (OUTPATIENT)
Dept: ALLERGY | Facility: CLINIC | Age: 29
End: 2024-09-19
Attending: FAMILY MEDICINE
Payer: COMMERCIAL

## 2024-09-19 VITALS
DIASTOLIC BLOOD PRESSURE: 78 MMHG | SYSTOLIC BLOOD PRESSURE: 114 MMHG | RESPIRATION RATE: 16 BRPM | BODY MASS INDEX: 31.03 KG/M2 | WEIGHT: 220.4 LBS | HEART RATE: 74 BPM | OXYGEN SATURATION: 99 %

## 2024-09-19 DIAGNOSIS — T78.2XXA ANAPHYLAXIS, INITIAL ENCOUNTER: ICD-10-CM

## 2024-09-19 DIAGNOSIS — L50.9 HIVES: Primary | ICD-10-CM

## 2024-09-19 PROCEDURE — 99203 OFFICE O/P NEW LOW 30 MIN: CPT | Performed by: INTERNAL MEDICINE

## 2024-09-19 NOTE — LETTER
ANAPHYLAXIS ALLERGY PLAN    Name: Liliana Henley      :  1995    Allergy to:  [place], sunflower, sesame    Weight: 220 lbs 6.4 oz           Asthma:  No      Do not depend on antihistamines or inhalers (bronchodilators) to treat a severe reaction; USE EPINEPHRINE      MEDICATIONS/DOSES  Epinephrine:  Epipen  Epinephrine dose:  0.3 mg IM  Antihistamine:  Zyrtec (Cetirizine)  Antihistamine dose:  20 mg        ANAPHYLAXIS ALLERGY PLAN (Page 2)  Patient:  Liliana Henley  :  1995         Electronically signed on 2024 by:  Farhan Kaplan MD  Parent/Guardian Authorization Signature:  ___________________________ Date:    FORM PROVIDED COURTESY OF FOOD ALLERGY RESEARCH & EDUCATION (FARE) ([email or web address]) 2017

## 2024-09-19 NOTE — PROGRESS NOTES
Liliana Henley was seen in the Allergy Clinic at [facility].    Liliana Henley is a 29 year old female being seen today at the request of Wilian Villagomez MD, [facility] in consultation for allergic reaction to multiple foods.  She describes symptoms with an oatmeal raisin cookie that she developed dizziness and nausea and took 4 Benadryl.  An ambulance was called and checked her vital signs and was able to depart without requiring emergent care.  [place] nuts and sesame have resulted in hives on intermittent occasions.  She had sushi with sesame seeds without any problems recently.  She had issues with 2 beers such as a blue moon and a corona causing itching and hives and also wine.  Sunflower seeds also resulted in hives.  Grapes resulted in itching.    She is wondering what food she is actually allergic to and what food she needs to avoid.  The intermittent nature of these reactions makes it difficult for her to understand how to proceed.  She also has seasonal allergies which are mild and respond well to Zyrtec.    No past medical history on file.  Family History   Problem Relation Age of Onset    No Known Problems Mother     Hypertension Father     Aortic aneurysm Father     Hypertension Sister     No Known Problems Brother      No past surgical history on file.    ENVIRONMENTAL HISTORY:   Pets inside the house include 1 dog(s).  Do you smoke cigarettes or other recreational drugs? No There is/are 0 smokers living in the house. The house does not have a damp basement.     SOCIAL HISTORY:   Liliana is employed as . She lives with her fiance.      Review of Systems      Current Outpatient Medications:     buPROPion (WELLBUTRIN XL) 300 MG 24 hr tablet, Take 1 tablet (300 mg) by mouth every morning, Disp: 90 tablet, Rfl: 3    sertraline (ZOLOFT) 100 MG tablet, Take 1 tablet (100 mg) by mouth daily, Disp: 90 tablet, Rfl: 3    EPINEPHrine (ANY BX GENERIC EQUIV)  0.3 MG/0.3ML injection 2-pack, Inject 0.3 mLs (0.3 mg) into the muscle as needed for anaphylaxis May repeat one time in 5-15 minutes if response to initial dose is inadequate., Disp: 2 each, Rfl: 1  Allergies   Allergen Reactions    Sunflower Seed [Sunflower Oil] Unknown     Sun flower seed         EXAM:   /78 (BP Location: Left arm, Patient Position: Sitting, Cuff Size: Adult Large)   Pulse 74   Resp 16   Wt 100 kg (220 lb 6.4 oz)   SpO2 99%   BMI 31.03 kg/m      Physical Exam    Constitutional:       General: She is not in acute distress.     Appearance: Normal appearance. She is not ill-appearing.   HENT:      Head: Normocephalic and atraumatic.      Nose: Nose normal. No congestion or rhinorrhea.      Mouth/Throat:      Mouth: Mucous membranes are moist.      Pharynx: Oropharynx is clear. No posterior oropharyngeal erythema.   Eyes:      General:         Right eye: No discharge.         Left eye: No discharge.   Cardiovascular:      Rate and Rhythm: Normal rate and regular rhythm.      Heart sounds: Normal heart sounds.   Pulmonary:      Effort: Pulmonary effort is normal.      Breath sounds: Normal breath sounds. No wheezing or rhonchi.   Skin:     General: Skin is warm.      Findings: No erythema or rash.   Neurological:      General: No focal deficit present.      Mental Status: She is alert. Mental status is at baseline.   Psychiatric:         Mood and Affect: Mood normal.         Behavior: Behavior normal.       Latest Reference Range & Units 05/01/24 10:18   Allergen [place] <0.10 KU(A)/L 0.44 (H)   Allergen Cashew <0.10 KU(A)/L <0.10   Allergen Sunflower Seed <0.10 KU(A)/L 0.61 (H)   Allergen, Sesame Seed <0.10 KU(A)/L 0.83 (H)   (H): Data is abnormally high    ASSESSMENT/PLAN:  Liliana Henley is a 29 year old female seen today for evaluation of food allergy concerns.  She is having adverse food reactions and does have a positive blood tests to sunflower, almond, sesame and negative to cashew.   Currently avoiding all tree nuts.  The blood test results were low level and difficult to determine if these are clinically relevant.  She has intermittent symptoms with foods.    Zyrtec 20 mg as needed for reactions and 10 mg as needed for allergies  Try Zyrtec instead of Benadryl  Epipen for more severe reaction (letter provided)  Labs ordered.    Follow-up in 1 month      Thank you for allowing me to participate in the care of Liliana Henley.      I spent 35 minutes on the date of the encounter doing chart review, history and exam, documentation and further coordination as noted above exclusive of separately reported interpretations    Farhan Kaplan MD  Allergy/Immunology  [facility]

## 2024-09-19 NOTE — LETTER
9/19/2024      Liliana Henley  [address] [place] [address]      Dear Colleague,    Thank you for referring your patient, Liliana Henley, to the [facility]TY [facility]. Please see a copy of my visit note below.    Liliana Henley was seen in the Allergy Clinic at [facility].    Liliana Henley is a 29 year old female being seen today at the request of Wilian Villagomez MD, [facility] in consultation for allergic reaction to multiple foods.  She describes symptoms with an oatmeal raisin cookie that she developed dizziness and nausea and took 4 Benadryl.  An ambulance was called and checked her vital signs and was able to depart without requiring emergent care.  [place] nuts and sesame have resulted in hives on intermittent occasions.  She had sushi with sesame seeds without any problems recently.  She had issues with 2 beers such as a blue moon and a corona causing itching and hives and also wine.  Sunflower seeds also resulted in hives.  Grapes resulted in itching.    She is wondering what food she is actually allergic to and what food she needs to avoid.  The intermittent nature of these reactions makes it difficult for her to understand how to proceed.  She also has seasonal allergies which are mild and respond well to Zyrtec.    No past medical history on file.  Family History   Problem Relation Age of Onset     No Known Problems Mother      Hypertension Father      Aortic aneurysm Father      Hypertension Sister      No Known Problems Brother      No past surgical history on file.    ENVIRONMENTAL HISTORY:   Pets inside the house include 1 dog(s).  Do you smoke cigarettes or other recreational drugs? No There is/are 0 smokers living in the house. The house does not have a damp basement.     SOCIAL HISTORY:   Liliana is employed as . She lives with her fiance.      Review of Systems      Current Outpatient Medications:      buPROPion  (WELLBUTRIN XL) 300 MG 24 hr tablet, Take 1 tablet (300 mg) by mouth every morning, Disp: 90 tablet, Rfl: 3     sertraline (ZOLOFT) 100 MG tablet, Take 1 tablet (100 mg) by mouth daily, Disp: 90 tablet, Rfl: 3     EPINEPHrine (ANY BX GENERIC EQUIV) 0.3 MG/0.3ML injection 2-pack, Inject 0.3 mLs (0.3 mg) into the muscle as needed for anaphylaxis May repeat one time in 5-15 minutes if response to initial dose is inadequate., Disp: 2 each, Rfl: 1  Allergies   Allergen Reactions     Sunflower Seed [Sunflower Oil] Unknown     Sun flower seed         EXAM:   /78 (BP Location: Left arm, Patient Position: Sitting, Cuff Size: Adult Large)   Pulse 74   Resp 16   Wt 100 kg (220 lb 6.4 oz)   SpO2 99%   BMI 31.03 kg/m      Physical Exam    Constitutional:       General: She is not in acute distress.     Appearance: Normal appearance. She is not ill-appearing.   HENT:      Head: Normocephalic and atraumatic.      Nose: Nose normal. No congestion or rhinorrhea.      Mouth/Throat:      Mouth: Mucous membranes are moist.      Pharynx: Oropharynx is clear. No posterior oropharyngeal erythema.   Eyes:      General:         Right eye: No discharge.         Left eye: No discharge.   Cardiovascular:      Rate and Rhythm: Normal rate and regular rhythm.      Heart sounds: Normal heart sounds.   Pulmonary:      Effort: Pulmonary effort is normal.      Breath sounds: Normal breath sounds. No wheezing or rhonchi.   Skin:     General: Skin is warm.      Findings: No erythema or rash.   Neurological:      General: No focal deficit present.      Mental Status: She is alert. Mental status is at baseline.   Psychiatric:         Mood and Affect: Mood normal.         Behavior: Behavior normal.       Latest Reference Range & Units 05/01/24 10:18   Allergen [place] <0.10 KU(A)/L 0.44 (H)   Allergen Cashew <0.10 KU(A)/L <0.10   Allergen Sunflower Seed <0.10 KU(A)/L 0.61 (H)   Allergen, Sesame Seed <0.10 KU(A)/L 0.83 (H)   (H): Data is  abnormally high    ASSESSMENT/PLAN:  Liliana Henley is a 29 year old female seen today for evaluation of food allergy concerns.  She is having adverse food reactions and does have a positive blood tests to sunflower, almond, sesame and negative to cashew.  Currently avoiding all tree nuts.  The blood test results were low level and difficult to determine if these are clinically relevant.  She has intermittent symptoms with foods.    Zyrtec 20 mg as needed for reactions and 10 mg as needed for allergies  Try Zyrtec instead of Benadryl  Epipen for more severe reaction (letter provided)  Labs ordered.    Follow-up in 1 month      Thank you for allowing me to participate in the care of Liliana Henley.      I spent 35 minutes on the date of the encounter doing chart review, history and exam, documentation and further coordination as noted above exclusive of separately reported interpretations    Farhan Kaplan MD  Allergy/Immunology  [facility]        Again, thank you for allowing me to participate in the care of your patient.        Sincerely,        Farhan Kaplan MD

## 2024-09-19 NOTE — PATIENT INSTRUCTIONS
Zyrtec 20 mg as needed for reactions and 10 mg as needed for allergies  Try Zyrtec instead of Benadryl  Epipen for more severe reaction (letter provided)  Labs      Allergy Staff Appt Hours Shot Hours Location       Physician   Farhan Kaplan MD      Support Staff   BUTCH Galdamez RN, MA Emily J., MA      Mondays Tuesdays Thursdays and Fridays:      [place] 7-5      Wednesdays         Close                Mondays, Tuesdays and Fridays:  7:20 - 3:40              [facility]  [address]  [place] [address]  Allergy appointment  line: [phone]    Pulmonary Function Scheduling:  [place]: [phone]           Questions about cost of your care  For questions about your cost of your visit, procedure, lab or imaging contact: [employer] Price Line [phone] or visit:  [email or web address]    Prescription Assistance  If you need assistance with your prescriptions (cost, coverage, etc) please contact: [employer] Prescription Assistance Program [phone]    Important Scheduling Information  All visits for food challenges, medication/drug allergy testing, and drug challenges MUST be scheduled through the allergy clinic nurse. Please contact them via [employer] or by calling the clinic at [phone] and asking to speak with an allergy nurse. They will provide additional information and instructions for the appointment. Discontinue oral antihistamines 7 days prior to the appointment. Discontinue nasal and ocular antihistamines 1 day prior to the appointment.    Appointments for skin testing: Appointment will last approximately 45 minutes.  Please call the appointment line for your clinic to schedule.  Discontinue oral antihistamines 7 days prior to the appointment.  Discontinue nasal and ocular antihistamines 1 days prior to appointment.    Thank you for trusting us with your care. Please feel free to contact us with  any questions or concerns you may have.

## 2024-09-25 ENCOUNTER — LAB (OUTPATIENT)
Dept: LAB | Facility: CLINIC | Age: 29
End: 2024-09-25
Payer: COMMERCIAL

## 2024-09-25 DIAGNOSIS — T78.2XXA ANAPHYLAXIS, INITIAL ENCOUNTER: ICD-10-CM

## 2024-09-25 DIAGNOSIS — L50.9 HIVES: ICD-10-CM

## 2024-09-25 LAB
Lab: NORMAL
PERFORMING LABORATORY: NORMAL
SPECIMEN STATUS: NORMAL
TEST NAME: NORMAL

## 2024-09-25 PROCEDURE — 83520 IMMUNOASSAY QUANT NOS NONAB: CPT

## 2024-09-25 PROCEDURE — 86003 ALLG SPEC IGE CRUDE XTRC EA: CPT

## 2024-09-25 PROCEDURE — 36415 COLL VENOUS BLD VENIPUNCTURE: CPT

## 2024-09-27 LAB
BRAZIL NUT IGE QN: <0.1 KU(A)/L
CASHEW NUT IGE QN: <0.1 KU(A)/L
COMMON RAGWEED IGE QN: 0.19 KU(A)/L
GIANT RAGWEED IGE QN: 1.14 KU(A)/L
HAZELNUT IGE QN: 4.34 KU(A)/L
MACADAMIA IGE QN: 0.42 KU(A)/L
MAYO MISC RESULT: ABNORMAL
MUGWORT IGE QN: 0.68 KU(A)/L
PEANUT IGE QN: 4.53 KU(A)/L
PECAN/HICK NUT IGE QN: 0.86 KU(A)/L
PINE NUT IGE QN: <0.1 KU(A)/L
PISTACHIO IGE QN: <0.1 KU(A)/L
SILVER BIRCH IGE QN: 7.2 KU(A)/L
TIMOTHY IGE QN: 2.96 KU(A)/L
TRYPTASE SERPL-MCNC: 2.4 UG/L

## 2024-09-28 LAB — WALNUT IGE QN: 8.07 KU(A)/L

## 2024-10-09 ENCOUNTER — MYC REFILL (OUTPATIENT)
Dept: FAMILY MEDICINE | Facility: CLINIC | Age: 29
End: 2024-10-09
Payer: COMMERCIAL

## 2024-10-09 ENCOUNTER — MYC MEDICAL ADVICE (OUTPATIENT)
Dept: FAMILY MEDICINE | Facility: CLINIC | Age: 29
End: 2024-10-09
Payer: COMMERCIAL

## 2024-10-09 DIAGNOSIS — F32.2 CURRENT SEVERE EPISODE OF MAJOR DEPRESSIVE DISORDER WITHOUT PSYCHOTIC FEATURES, UNSPECIFIED WHETHER RECURRENT (H): ICD-10-CM

## 2024-10-09 RX ORDER — BUPROPION HYDROCHLORIDE 300 MG/1
300 TABLET ORAL EVERY MORNING
Qty: 90 TABLET | Refills: 3 | Status: SHIPPED | OUTPATIENT
Start: 2024-10-09

## 2024-10-10 NOTE — TELEPHONE ENCOUNTER
Writer replied to patient via [employer].  RAFITA LawsonN, RN (she/her)  [facility] RN

## 2024-10-29 ENCOUNTER — OFFICE VISIT (OUTPATIENT)
Dept: ALLERGY | Facility: CLINIC | Age: 29
End: 2024-10-29
Attending: INTERNAL MEDICINE
Payer: COMMERCIAL

## 2024-10-29 ENCOUNTER — MYC REFILL (OUTPATIENT)
Dept: FAMILY MEDICINE | Facility: CLINIC | Age: 29
End: 2024-10-29

## 2024-10-29 VITALS
SYSTOLIC BLOOD PRESSURE: 133 MMHG | WEIGHT: 211 LBS | OXYGEN SATURATION: 99 % | BODY MASS INDEX: 29.7 KG/M2 | DIASTOLIC BLOOD PRESSURE: 88 MMHG | HEART RATE: 70 BPM

## 2024-10-29 DIAGNOSIS — L50.9 HIVES: ICD-10-CM

## 2024-10-29 DIAGNOSIS — T78.2XXA ANAPHYLAXIS, INITIAL ENCOUNTER: ICD-10-CM

## 2024-10-29 PROCEDURE — 99214 OFFICE O/P EST MOD 30 MIN: CPT | Performed by: INTERNAL MEDICINE

## 2024-10-29 RX ORDER — SERTRALINE HYDROCHLORIDE 100 MG/1
100 TABLET, FILM COATED ORAL DAILY
Qty: 90 TABLET | Refills: 3 | OUTPATIENT
Start: 2024-10-29

## 2024-10-29 NOTE — LETTER
10/29/2024      Liliana Henley  [address] [place] [address]      Dear Colleague,    Thank you for referring your patient, Liliana Henley, to the [facility] SPECIAL[facility] CLINIC [place]. Please see a copy of my visit note below.    Liliana Henley was seen in the Allergy Clinic at [facility].    Liliana Henley is a 29 year old female being seen today for ongoing evaluation of food allergy concerns.    Since the last visit the patient has been good.    Blood test were ordered at the last appointment which were positive to me as both environmental allergens as well as foods.    She has not had any reactions since last seen.  She does have an EpiPen available.    PAST ALLERGY HISTORY:    She describes symptoms with an oatmeal raisin cookie that she developed dizziness and nausea and took 4 Benadryl.  An ambulance was called and checked her vital signs and was able to depart without requiring emergent care.  [place] nuts and sesame have resulted in hives on intermittent occasions.  She had sushi with sesame seeds without any problems recently.  She had issues with 2 beers such as a blue moon and a corona causing itching and hives and also wine (the beers did have fruit in the beer).  Sunflower seeds also resulted in hives.  Grapes resulted in itching in the past.    She also has seasonal allergies which are mild and respond well to Zyrtec.     Latest Reference Range & Units 05/01/24 10:18 09/25/24 08:15   Allergen [place] <0.10 KU(A)/L 0.44 (H)    Allergen Cashew <0.10 KU(A)/L <0.10 <0.10   Allergen Mugwort IgE <0.10 KU(A)/L  0.68 (H)   Allergen Peanut <0.10 KU(A)/L  4.53 (H)   Allergen Pecan <0.10 KU(A)/L  0.86 (H)   Allergen Sunflower Seed <0.10 KU(A)/L 0.61 (H)    Allergen Varun <0.10 KU(A)/L  2.96 (H)   Allergen, Brazil Nut <0.10 KU(A)/L  <0.10   Allergen, Giant Ragweed <0.10 KU(A)/L  1.14 (H)   Allergen, Hazelnut <0.10 KU(A)/L  4.34 (H)   Allergen, Macadamia Nut <0.10 KU(A)/L  0.42 (H)    Allergen Pine Nut <0.10 KU(A)/L  <0.10   Allergen Pistachio <0.10 KU(A)/L  <0.10   Allergen, Ragweed Short <0.10 KU(A)/L  0.19 (H)   Allergen, Sesame Seed <0.10 KU(A)/L 0.83 (H)    Allergen, [place] <0.10 KU(A)/L  7.20 (H)   Allergen, [place] <0.10 KU(A)/L  8.07 (H)   (H): Data is abnormally high    No past medical history on file.  Family History   Problem Relation Age of Onset     No Known Problems Mother      Hypertension Father      Aortic aneurysm Father      Hypertension Sister      No Known Problems Brother      No past surgical history on file.      Current Outpatient Medications:      buPROPion (WELLBUTRIN XL) 300 MG 24 hr tablet, Take 1 tablet (300 mg) by mouth every morning., Disp: 90 tablet, Rfl: 3     EPINEPHrine (ANY BX GENERIC EQUIV) 0.3 MG/0.3ML injection 2-pack, Inject 0.3 mLs (0.3 mg) into the muscle as needed for anaphylaxis May repeat one time in 5-15 minutes if response to initial dose is inadequate., Disp: 2 each, Rfl: 1     sertraline (ZOLOFT) 100 MG tablet, Take 1 tablet (100 mg) by mouth daily, Disp: 90 tablet, Rfl: 3  Allergies   Allergen Reactions     Sunflower Seed [Sunflower Oil] Unknown     Sun flower seed         EXAM:   /88   Pulse 70   Wt 95.7 kg (211 lb)   SpO2 99%   BMI 29.70 kg/m          ASSESSMENT/PLAN:  Liliana Henley is a 29 year old female seen today to discuss food allergies.  She also has oral allergy syndrome.  She is allergic to Var[place] grass, birch, ragweed, and mugwort all which can cause oral allergy syndrome.  The foods that she is having minor symptoms to such as peanut, apple and almond would be consistent with oral allergy syndrome.  We discussed this in detail today.  A handout was provided on oral allergy syndrome.  She will plan to microwave apples in the future.    [place] and grape are should be avoided with the values of  walnut at 8 and and 10 for grape.  Recommend continued avoidance of these foods.  She had more significant reactions to  destini in the past.    Suggested Zyrtec instead of Benadryl in the future from mild symptoms.    She was reviewed Sheard by the information provided today we will plan to continue to avoid the grapes and grape products as well as the walnut.  She will let us know if there is any other food of concerns that develop in the future.    Zyrtec 10-20 mg as needed for reactions and 10 mg as needed for allergies  Try Zyrtec instead of Benadryl  Epipen for more severe reaction (letter provided at the last visit)    Follow-up as needed.       Thank you for allowing me to participate in the care of Liliana Henley.      I spent 30 minutes on the date of the encounter doing chart review, history and exam, documentation and further coordination as noted above exclusive of separately reported interpretations    Farhan Kaplan MD  Allergy/Immunology  [facility]      Again, thank you for allowing me to participate in the care of your patient.        Sincerely,        Farhan Kaplan MD

## 2024-10-29 NOTE — PATIENT INSTRUCTIONS
Zyrtec 10-20 mg as needed for reactions and 10 mg as needed for allergies  Try Zyrtec instead of Benadryl  Epipen for more severe reaction (letter provided)  Labs

## 2024-10-29 NOTE — PROGRESS NOTES
Liliana Henley was seen in the Allergy Clinic at [facility].    Liliana Henley is a 29 year old female being seen today for ongoing evaluation of food allergy concerns.    Since the last visit the patient has been good.    Blood test were ordered at the last appointment which were positive to me as both environmental allergens as well as foods.    She has not had any reactions since last seen.  She does have an EpiPen available.    PAST ALLERGY HISTORY:    She describes symptoms with an oatmeal raisin cookie that she developed dizziness and nausea and took 4 Benadryl.  An ambulance was called and checked her vital signs and was able to depart without requiring emergent care.  [place] nuts and sesame have resulted in hives on intermittent occasions.  She had sushi with sesame seeds without any problems recently.  She had issues with 2 beers such as a blue moon and a corona causing itching and hives and also wine (the beers did have fruit in the beer).  Sunflower seeds also resulted in hives.  Grapes resulted in itching in the past.    She also has seasonal allergies which are mild and respond well to Zyrtec.     Latest Reference Range & Units 05/01/24 10:18 09/25/24 08:15   Allergen [place] <0.10 KU(A)/L 0.44 (H)    Allergen Cashew <0.10 KU(A)/L <0.10 <0.10   Allergen Mugwort IgE <0.10 KU(A)/L  0.68 (H)   Allergen Peanut <0.10 KU(A)/L  4.53 (H)   Allergen Pecan <0.10 KU(A)/L  0.86 (H)   Allergen Sunflower Seed <0.10 KU(A)/L 0.61 (H)    Allergen Varun <0.10 KU(A)/L  2.96 (H)   Allergen, Brazil Nut <0.10 KU(A)/L  <0.10   Allergen, Giant Ragweed <0.10 KU(A)/L  1.14 (H)   Allergen, Hazelnut <0.10 KU(A)/L  4.34 (H)   Allergen, Macadamia Nut <0.10 KU(A)/L  0.42 (H)   Allergen Pine Nut <0.10 KU(A)/L  <0.10   Allergen Pistachio <0.10 KU(A)/L  <0.10   Allergen, Ragweed Short <0.10 KU(A)/L  0.19 (H)   Allergen, Sesame Seed <0.10 KU(A)/L 0.83 (H)    Allergen, [place] <0.10 KU(A)/L  7.20 (H)   Allergen,  [place] <0.10 KU(A)/L  8.07 (H)   (H): Data is abnormally high    No past medical history on file.  Family History   Problem Relation Age of Onset    No Known Problems Mother     Hypertension Father     Aortic aneurysm Father     Hypertension Sister     No Known Problems Brother      No past surgical history on file.      Current Outpatient Medications:     buPROPion (WELLBUTRIN XL) 300 MG 24 hr tablet, Take 1 tablet (300 mg) by mouth every morning., Disp: 90 tablet, Rfl: 3    EPINEPHrine (ANY BX GENERIC EQUIV) 0.3 MG/0.3ML injection 2-pack, Inject 0.3 mLs (0.3 mg) into the muscle as needed for anaphylaxis May repeat one time in 5-15 minutes if response to initial dose is inadequate., Disp: 2 each, Rfl: 1    sertraline (ZOLOFT) 100 MG tablet, Take 1 tablet (100 mg) by mouth daily, Disp: 90 tablet, Rfl: 3  Allergies   Allergen Reactions    Sunflower Seed [Sunflower Oil] Unknown     Sun flower seed         EXAM:   /88   Pulse 70   Wt 95.7 kg (211 lb)   SpO2 99%   BMI 29.70 kg/m          ASSESSMENT/PLAN:  Liliana Henley is a 29 year old female seen today to discuss food allergies.  She also has oral allergy syndrome.  She is allergic to Var[place] grass, birch, ragweed, and mugwort all which can cause oral allergy syndrome.  The foods that she is having minor symptoms to such as peanut, apple and almond would be consistent with oral allergy syndrome.  We discussed this in detail today.  A handout was provided on oral allergy syndrome.  She will plan to microwave apples in the future.    [place] and grape are should be avoided with the values of  walnut at 8 and and 10 for grape.  Recommend continued avoidance of these foods.  She had more significant reactions to raisin in the past.    Suggested Zyrtec instead of Benadryl in the future from mild symptoms.    She was reviewed Sheard by the information provided today we will plan to continue to avoid the grapes and grape products as well as the walnut.  She will let  us know if there is any other food of concerns that develop in the future.    Zyrtec 10-20 mg as needed for reactions and 10 mg as needed for allergies  Try Zyrtec instead of Benadryl  Epipen for more severe reaction (letter provided at the last visit)    Follow-up as needed.       Thank you for allowing me to participate in the care of Liliana Henley.      I spent 30 minutes on the date of the encounter doing chart review, history and exam, documentation and further coordination as noted above exclusive of separately reported interpretations    Farhan Kaplan MD  Allergy/Immunology  [facility]

## 2025-06-26 ENCOUNTER — E-VISIT (OUTPATIENT)
Dept: URGENT CARE | Facility: CLINIC | Age: 30
End: 2025-06-26
Payer: COMMERCIAL

## 2025-06-26 DIAGNOSIS — R30.0 DYSURIA: Primary | ICD-10-CM

## 2025-06-26 NOTE — PATIENT INSTRUCTIONS
Deachirag Ford,     After reviewing your responses, I would like you to come in for a urine test to make sure we treat you correctly. This urine test is to evaluate you for a possible urinary tract infection, and should be completed today or tomorrow. Schedule a Lab Only appointment here.     If a Lab appointment is not available, please check this site for lab locations and hours (many labs are closed evenings and weekends). You may walk into any Lab location during their operating hours without an appointment to complete your urine test. Please let the lab staff know that you have had an eVisit and your provider has ordered a urine test.     You will receive instructions with your results in [facility] once they are available.     If your symptoms worsen, you develop pain in your back or stomach, develop fevers, or are not improving in 5 days, please contact your primary care provider for an appointment or visit a Walk-in or Urgent Care Center to be seen.     Thanks again for choosing us as your health care partner,     Roman Young MD, MD

## 2025-07-01 ENCOUNTER — OFFICE VISIT (OUTPATIENT)
Dept: FAMILY MEDICINE | Facility: CLINIC | Age: 30
End: 2025-07-01
Payer: COMMERCIAL

## 2025-07-01 VITALS
HEART RATE: 91 BPM | SYSTOLIC BLOOD PRESSURE: 107 MMHG | WEIGHT: 160 LBS | RESPIRATION RATE: 16 BRPM | HEIGHT: 71 IN | OXYGEN SATURATION: 100 % | TEMPERATURE: 97.5 F | DIASTOLIC BLOOD PRESSURE: 74 MMHG | BODY MASS INDEX: 22.4 KG/M2

## 2025-07-01 DIAGNOSIS — R30.0 DYSURIA: Primary | ICD-10-CM

## 2025-07-01 DIAGNOSIS — Z32.01 PREGNANCY TEST POSITIVE: ICD-10-CM

## 2025-07-01 LAB
BACTERIA #/AREA URNS HPF: ABNORMAL /HPF
C TRACH DNA SPEC QL PROBE+SIG AMP: NEGATIVE
N GONORRHOEA DNA SPEC QL NAA+PROBE: NEGATIVE
RBC #/AREA URNS AUTO: ABNORMAL /HPF
SPECIMEN TYPE: NORMAL
SQUAMOUS #/AREA URNS AUTO: ABNORMAL /LPF
T VAGINALIS DNA SPEC QL NAA+PROBE: NOT DETECTED
WBC #/AREA URNS AUTO: >100 /HPF

## 2025-07-01 PROCEDURE — 81015 MICROSCOPIC EXAM OF URINE: CPT | Performed by: PHYSICIAN ASSISTANT

## 2025-07-01 PROCEDURE — 87591 N.GONORRHOEAE DNA AMP PROB: CPT | Performed by: PHYSICIAN ASSISTANT

## 2025-07-01 PROCEDURE — 87086 URINE CULTURE/COLONY COUNT: CPT | Performed by: PHYSICIAN ASSISTANT

## 2025-07-01 PROCEDURE — 87661 TRICHOMONAS VAGINALIS AMPLIF: CPT | Performed by: PHYSICIAN ASSISTANT

## 2025-07-01 PROCEDURE — 87491 CHLMYD TRACH DNA AMP PROBE: CPT | Performed by: PHYSICIAN ASSISTANT

## 2025-07-01 PROCEDURE — 87088 URINE BACTERIA CULTURE: CPT | Performed by: PHYSICIAN ASSISTANT

## 2025-07-01 PROCEDURE — 99214 OFFICE O/P EST MOD 30 MIN: CPT | Performed by: PHYSICIAN ASSISTANT

## 2025-07-01 PROCEDURE — 3074F SYST BP LT 130 MM HG: CPT | Performed by: PHYSICIAN ASSISTANT

## 2025-07-01 PROCEDURE — 3078F DIAST BP <80 MM HG: CPT | Performed by: PHYSICIAN ASSISTANT

## 2025-07-01 RX ORDER — CEPHALEXIN 500 MG/1
500 CAPSULE ORAL 2 TIMES DAILY
Qty: 14 CAPSULE | Refills: 0 | Status: SHIPPED | OUTPATIENT
Start: 2025-07-01 | End: 2025-07-08

## 2025-07-01 ASSESSMENT — PATIENT HEALTH QUESTIONNAIRE - PHQ9
SUM OF ALL RESPONSES TO PHQ QUESTIONS 1-9: 6
SUM OF ALL RESPONSES TO PHQ QUESTIONS 1-9: 6
10. IF YOU CHECKED OFF ANY PROBLEMS, HOW DIFFICULT HAVE THESE PROBLEMS MADE IT FOR YOU TO DO YOUR WORK, TAKE CARE OF THINGS AT HOME, OR GET ALONG WITH OTHER PEOPLE: SOMEWHAT DIFFICULT

## 2025-07-01 NOTE — PROGRESS NOTES
"HPI:   Liliana Henley is a 30 year old female with chief complaint of possible UTI.  She was treated for suspected UTI with nitrofurantoin about 6 weeks ago.  Unclear if urine culture was done at that time.  Symptoms improved a little bit did not go away completely.  She did have urinalysis done last week and was normal.  She is here today with continuing symptoms.  She has had pain with urination, urine urgency, blood in her urine, abdominal cramping for 2 weeks.  She did take Azo today.  She also found out she was pregnant last week.  Pregnancy.    Patient Active Problem List   Diagnosis    Heartburn    Anxiety    Moderate major depression (H)    Anaphylaxis, initial encounter    Healthcare maintenance    Attention deficit hyperactivity disorder (ADHD), predominantly inattentive type    Pregnancy test positive       Current Outpatient Medications:     buPROPion (WELLBUTRIN XL) 300 MG 24 hr tablet, Take 1 tablet (300 mg) by mouth every morning., Disp: 90 tablet, Rfl: 3    cephALEXin (KEFLEX) 500 MG capsule, Take 1 capsule (500 mg) by mouth 2 times daily for 7 days., Disp: 14 capsule, Rfl: 0    EPINEPHrine (ANY BX GENERIC EQUIV) 0.3 MG/0.3ML injection 2-pack, Inject 0.3 mLs (0.3 mg) into the muscle as needed for anaphylaxis May repeat one time in 5-15 minutes if response to initial dose is inadequate., Disp: 2 each, Rfl: 1    sertraline (ZOLOFT) 100 MG tablet, Take 1 tablet (100 mg) by mouth daily, Disp: 90 tablet, Rfl: 3    Objective:  Allergies:  Allergies   Allergen Reactions    Sunflower Seed [Sunflower Oil] Unknown     Sun flower seed       Vitals:    07/01/25 0927   BP: 107/74   BP Location: Left arm   Patient Position: Sitting   Cuff Size: Adult Regular   Pulse: 91   Resp: 16   Temp: 97.5  F (36.4  C)   TempSrc: Temporal   SpO2: 100%   Weight: 72.6 kg (160 lb)   Height: 1.795 m (5' 10.67\")     Body mass index is 22.53 kg/m .    Vital signs reviewed  General: Patient is alert and oriented x 3, in no apparent " distress  Abdomen: No pain with CVA palpation bilaterally, no pain with suprapubic palpation      Results for orders placed or performed in visit on 07/01/25   Urine Microscopic Exam     Status: Abnormal   Result Value Ref Range    Bacteria Urine Moderate (A) None Seen /HPF    RBC Urine 2-5 (A) 0-2 /HPF /HPF    WBC Urine >100 (A) 0-5 /HPF /HPF    Squamous Epithelials Urine Few (A) None Seen /LPF   Trichomonas vaginalis by PCR     Status: Normal    Specimen: Urine, Voided   Result Value Ref Range    Trichomonas vaginalis by PCR Not Detected Not Detected    Narrative    The [employer] Xpert TV Assay, performed on the [employer], is a qualitative in vitro diagnostic test for the detection of Trichomonas vaginalis genomic DNA. The test utilizes automated real-time polymerase chain reaction (PCR). The Xpert TV Assay uses female and male urine specimens, endocervical swab specimens, or patient-collected vaginal swab specimens (collected in a clinical setting). The Xpert TV Assay is intended to aid in the diagnosis of trichomoniasis in symptomatic or asymptomatic individuals.     Urine culture is pending.    Assessment and Plan:  1. Dysuria (Primary)  New concern.  History of treatment for possible UTI on 1025.  Symptoms did not resolve completely.  Grossly normal urinalysis last week.  Urinalysis not accurate today due to patient taking Azo.  Micro was suspicious for possible infection.  Because patient is pregnant, and significant symptoms, I elected to treat today.  Prescription sent for Keflex.  This should be compatible with pregnancy.  Other screening labs ordered including urine culture and I will follow-up with results.  - Urine Microscopic Exam; Future  - Urine Microscopic Exam  - Chlamydia trachomatis/Neisseria gonorrhoeae by PCR  - Urine Culture; Future  - cephALEXin (KEFLEX) 500 MG capsule; Take 1 capsule (500 mg) by mouth 2 times daily for 7 days.  Dispense: 14 capsule; Refill: 0  - Trichomonas  vaginalis by PCR; Future  - Urine Culture  - Trichomonas vaginalis by PCR    2. Pregnancy test positive  Positive pregnancy test last week at another clinic.  Patient is approximately 5 weeks gestation.  She has OB intake scheduled next week.      This dictation uses voice recognition software, which may contain typographical errors.

## 2025-07-01 NOTE — PROGRESS NOTES
Prior to immunization administration, verified patients identity using patient s name and date of birth. Please see Immunization Activity for additional information.     Screening Questionnaire for Adult Immunization    Are you sick today?   No   Do you have allergies to medications, food, a vaccine component or latex?   Yes   Have you ever had a serious reaction after receiving a vaccination?   No   Do you have a long-term health problem with heart, lung, kidney, or metabolic disease (e.g., diabetes), asthma, a blood disorder, no spleen, complement component deficiency, a cochlear implant, or a spinal fluid leak?  Are you on long-term aspirin therapy?   No   Do you have cancer, leukemia, HIV/AIDS, or any other immune system problem?   No   Do you have a parent, brother, or sister with an immune system problem?   No   In the past 3 months, have you taken medications that affect  your immune system, such as prednisone, other steroids, or anticancer drugs; drugs for the treatment of rheumatoid arthritis, Crohn s disease, or psoriasis; or have you had radiation treatments?   No   Have you had a seizure, or a brain or other nervous system problem?   No   During the past year, have you received a transfusion of blood or blood    products, or been given immune (gamma) globulin or antiviral drug?   No   For women: Are you pregnant or is there a chance you could become       pregnant during the next month?   Yes   Have you received any vaccinations in the past 4 weeks?   No     Immunization questionnaire was positive for at least one answer.  Notified provider.      Patient instructed to remain in clinic for 15 minutes afterwards, and to report any adverse reactions.     Screening performed by Alfredo Tucker MA on 7/1/2025 at 9:19 AM.

## 2025-07-02 LAB — BACTERIA UR CULT: ABNORMAL

## 2025-07-08 ENCOUNTER — VIRTUAL VISIT (OUTPATIENT)
Dept: OBGYN | Facility: CLINIC | Age: 30
End: 2025-07-08
Attending: ADVANCED PRACTICE MIDWIFE
Payer: COMMERCIAL

## 2025-07-08 DIAGNOSIS — Z32.01 PREGNANCY TEST POSITIVE: Primary | ICD-10-CM

## 2025-07-08 NOTE — LETTER
7/8/2025       RE: Liliana Henley  [address] [place] [address]     Dear Colleague,    Thank you for referring your patient, Liliana Henley, to the [facility] [place] [facility]. Please see a copy of my visit note below.    [facility] RN Prenatal Intake note  Subjective     30 year old female newly pregnant.  LMP: 5/24/25 (approximate and regular cycles).     Pregnancy is unplanned but welcomed.    Partner/support person name and relationship - boyfriend, Anthony.        Symptoms since LMP include cramping, nausea, breast tenderness, and fatigue. Patient has tried these relief measures: increased rest.    OB HISTORY  OB History   No obstetric history on file.       OB COMPLICATIONS  *First Pregnancy    PERSONAL/SOCIAL HISTORY tab - updated all tabs in history, including current job and partner's name  single and alone.  Employment: Full time as a .  Job involves sedentary activity and moderate activity .  Her partner works as a medical coding.     MENTAL HEALTH HISTORY:  anxiety, depression, current medication, and past medication    - Current Medications    Current Outpatient Medications   Medication Sig Dispense Refill     buPROPion (WELLBUTRIN XL) 300 MG 24 hr tablet Take 1 tablet (300 mg) by mouth every morning. 90 tablet 3     cephALEXin (KEFLEX) 500 MG capsule Take 1 capsule (500 mg) by mouth 2 times daily for 7 days. 14 capsule 0     EPINEPHrine (ANY BX GENERIC EQUIV) 0.3 MG/0.3ML injection 2-pack Inject 0.3 mLs (0.3 mg) into the muscle as needed for anaphylaxis May repeat one time in 5-15 minutes if response to initial dose is inadequate. 2 each 1     sertraline (ZOLOFT) 100 MG tablet Take 1 tablet (100 mg) by mouth daily 90 tablet 3           - Co-morbids  No past medical history on file.    - Genetic/Infection questionnaire completed, risks include maternal cousins with cystic fibrosis. Discussed genetic screening  options, patient does desire first trimester genetic screening  Pt  does not have a recent known exposure to Parvo or CMV so IgG/IgM testing WILL NOT be ordered.   Flu Vaccine: Patient declined, do not offer  COVID Vaccine: Hx of COVID illness  (details below) and Has received most recent COVID booster  Last pap smear: 2020  - Discussed expectations for routine prenatal care and scheduling  - Reviewed CNM and MD team - patient plans to have care  Patient Provider Group choice: CNM group  - Reviewed use of triage RN team for urgent symptom review (along with contacting after hours provider), and use of [employer] messaging for non-urgent questions, concerns or requests  - Patient was encouraged to start prenatal vitamins as tolerated, if experiencing nausea and vomiting then OK to switch to folic acid only at this time  -Reconciled outdated diagnoses on problem list, entered current pregnancy diagnosis and any new clinical diagnoses  -Additional items: None  -Pt scheduled for dating US and NOB on 25  Prenatal Yoga  Patient currently is not participating in prenatal yoga.       Reviewed benefits of participating in prenatal yoga 20-60 minutes at least 2-3 times weekly:   Mental Health  Decreased anxiety, depression, and feelings of stress  Pregnancy   Decreased low back/belly/leg pain  Improved sleep  Decreased pregnancy complications, such as high blood pressure, preeclampsia, gestational diabetes, or  labor  Childbirth  Reduced need for labor interventions, such as induction, operative vaginal delivery, or   Decreased pain in labor  Shortened duration of labor  Benefits for Baby  Higher Apgar scores at delivery  Decreased need for NICU admission  Enhanced parental/infant bonding    Resources provided include: Prenatal yoga information and resource list in AVS       Lorelei Jeffries RN     Again, thank you for allowing me to participate in the care of your patient.      Sincerely,    [facility]  OBGYN NURSE EDUCATION

## 2025-07-08 NOTE — PROGRESS NOTES
[facility] RN Prenatal Intake note  Subjective     30 year old female newly pregnant.  LMP: 5/24/25 (approximate and regular cycles).     Pregnancy is unplanned but welcomed.    Partner/support person name and relationship - boyfriend, Anthony.        Symptoms since LMP include cramping, nausea, breast tenderness, and fatigue. Patient has tried these relief measures: increased rest.    OB HISTORY  OB History   No obstetric history on file.       OB COMPLICATIONS  *First Pregnancy    PERSONAL/SOCIAL HISTORY tab - updated all tabs in history, including current job and partner's name  single and alone.  Employment: Full time as a .  Job involves sedentary activity and moderate activity .  Her partner works as a medical coding.     MENTAL HEALTH HISTORY:  anxiety, depression, current medication, and past medication    - Current Medications    Current Outpatient Medications   Medication Sig Dispense Refill    buPROPion (WELLBUTRIN XL) 300 MG 24 hr tablet Take 1 tablet (300 mg) by mouth every morning. 90 tablet 3    cephALEXin (KEFLEX) 500 MG capsule Take 1 capsule (500 mg) by mouth 2 times daily for 7 days. 14 capsule 0    EPINEPHrine (ANY BX GENERIC EQUIV) 0.3 MG/0.3ML injection 2-pack Inject 0.3 mLs (0.3 mg) into the muscle as needed for anaphylaxis May repeat one time in 5-15 minutes if response to initial dose is inadequate. 2 each 1    sertraline (ZOLOFT) 100 MG tablet Take 1 tablet (100 mg) by mouth daily 90 tablet 3           - Co-morbids  No past medical history on file.    - Genetic/Infection questionnaire completed, risks include maternal cousins with cystic fibrosis. Discussed genetic screening options, patient does desire first trimester genetic screening  Pt  does not have a recent known exposure to Parvo or CMV so IgG/IgM testing WILL NOT be ordered.   Flu Vaccine: Patient declined, do not offer  COVID Vaccine: Hx of COVID illness  (details below) and Has received most recent COVID booster  Last pap  smear: 2020  - Discussed expectations for routine prenatal care and scheduling  - Reviewed CNM and MD team - patient plans to have care  Patient Provider Group choice: CNM group  - Reviewed use of triage RN team for urgent symptom review (along with contacting after hours provider), and use of [employer] messaging for non-urgent questions, concerns or requests  - Patient was encouraged to start prenatal vitamins as tolerated, if experiencing nausea and vomiting then OK to switch to folic acid only at this time  -Reconciled outdated diagnoses on problem list, entered current pregnancy diagnosis and any new clinical diagnoses  -Additional items: None  -Pt scheduled for dating US and NOB on 25  Prenatal Yoga  Patient currently is not participating in prenatal yoga.       Reviewed benefits of participating in prenatal yoga 20-60 minutes at least 2-3 times weekly:   Mental Health  Decreased anxiety, depression, and feelings of stress  Pregnancy   Decreased low back/belly/leg pain  Improved sleep  Decreased pregnancy complications, such as high blood pressure, preeclampsia, gestational diabetes, or  labor  Childbirth  Reduced need for labor interventions, such as induction, operative vaginal delivery, or   Decreased pain in labor  Shortened duration of labor  Benefits for Baby  Higher Apgar scores at delivery  Decreased need for NICU admission  Enhanced parental/infant bonding    Resources provided include: Prenatal yoga information and resource list in AVS       Lorelei Jeffries RN

## 2025-07-08 NOTE — PATIENT INSTRUCTIONS
Thank you for trusting us with your care!   Please be aware, if you are on Mychart, you may see your results prior to your providers review. If labs are abnormal, we will call or message you on Mychart with a follow up plan.    If you need to contact us for questions about:  Symptoms, Scheduling & Medical Questions; Non-urgent (2-3 day response) Mychart message, Urgent (needing response today) [phone] (if after 3:30pm next day response)   Prescriptions: Please call your Pharmacy   Billing: Rosalee [phone] or [facility]:[phone]   Prenatal Yoga  The American College of Obstetricians and Gynecologists (ACOG) recommends at least 150 minutes of moderate-intensity exercise weekly during pregnancy. Moderate-intensity exercise means you can talk but not sing.     Prenatal yoga is a form of exercise that combines deep breathing, stretching, and strengthening movements.  Studies have shown that participating in prenatal yoga for 20-60 minutes, at least 2-3 times weekly has several significant benefits, including:   Improvements in discomforts of pregnancy  Decreased belly, low back, hip, and leg pain  Decreased depression, anxiety, or feelings of stress  Improved sleep   Decreased risk of pregnancy complications, including  Gestational diabetes  High blood pressure  Pre-eclampsia   labor  Improved labor and delivery outcomes  Decreased need for induction, , or operative vaginal delivery (forceps, vacuum)  [place] and less painful labor   Improved outcomes for baby  Better skin color, pulse, and breathing at birth  Improved parental/infant bonding  Decreased need for NICU admission    Although it is recommended to start prenatal yoga early in pregnancy, studies have shown benefits in people who started yoga during week 27 and beyond.     Below are local in-person and virtual opportunities for prenatal yoga classes:     [facility]  [address] [place]  [address]  [phone]  [facility]  *Offers monthly unlimited in-person and virtual memberships, as well as punch cards, a new-student membership, and reduced-cost monthly memberships for those who qualify.    [facility]  [address], [address]  [place] [address]  [phone]  [facility]  *Offer once weekly in-person prenatal yoga    Free Online Classes  These are two yoga teachers who recorded weekly classes throughout their own pregnancies and offer them for free on Youtube.  We recommend doing each weekly class 2-3 times in the week.   [facility] [employer]: Week by Week Prenatal Yoga- YouTube  [facility]: Week by Week Yoga for a Healthy Pregnancy- PeerMeube   Learning About Pregnancy  Your Care Instructions     Your health in the early weeks of your pregnancy is particularly important for your baby's health. Take good care of yourself. Anything you do that harms your body can also harm your baby.  Make sure to go to all of your doctor appointments. Regular checkups will help keep you and your baby healthy.  How can you care for yourself at home?  Diet    Choose healthy foods like fruits, vegetables, whole grains, lean proteins, and healthy fats.     Choose foods that are good sources of calcium, iron, and folate. You can try dairy products, dark leafy greens, fortified orange juice and cereals, almonds, broccoli, dried fruit, and beans.     Do not skip meals or go for many hours without eating. If you are nauseated, try to eat a small, healthy snack every 2 to 3 hours.     Avoid fish that are high in mercury. These include shark, swordfish, reena mackerel, marlin, orange roughy, and bigeye tuna, as well as tilefish from the [place].     It's okay to eat up to 8 to 12 ounces a week of fish that are low in mercury or up to 4 ounces a week of fish that have medium levels of mercury. Some fish that are low in mercury are salmon, shrimp, canned light tuna, cod,  and tilapia. Some fish that have medium levels of mercury are halibut and white albacore tuna.     Drink plenty of fluids. If you have kidney, heart, or liver disease and have to limit fluids, talk with your doctor before you increase the amount of fluids you drink.     Limit caffeine to about 200 to 300 mg per day. On average, a cup of brewed coffee has around 80 to 100 mg of caffeine.     Do not drink alcohol, such as beer, wine, or hard liquor.     Take a multivitamin that contains at least 400 micrograms (mcg) of folic acid to help prevent birth defects. Fortified cereal and whole wheat bread are good additional sources of folic acid.     Increase the calcium in your diet. Try to drink a quart of skim milk each day. You may also take calcium supplements and choose foods such as cheese and yogurt.   Lifestyle    Make sure you go to your follow-up appointments.     Get plenty of rest. You may be unusually tired while you are pregnant.     Get at least 30 minutes of exercise on most days of the week. Walking is a good choice. If you have not exercised in the past, start out slowly. Take several short walks each day.     Do not smoke. If you need help quitting, talk to your doctor about stop-smoking programs. These can increase your chances of quitting for good.     Do not touch cat feces or litter boxes. Also, wash your hands after you handle raw meat, and fully cook all meat before you eat it. Wear gloves when you work in the yard or garden, and wash your hands well when you are done. Cat feces, raw or undercooked meat, and contaminated dirt can cause an infection that may harm your baby or lead to a miscarriage.     Avoid things that can make your body too hot and may be harmful to your baby, such as a hot tub or sauna. Or talk with your doctor before doing anything that raises your body temperature. Your doctor can tell you if it's safe.     Avoid chemical fumes, paint fumes, or poisons.     Do not use illegal  drugs, marijuana, or alcohol.   Medicines    Review all of your medicines with your doctor. Some of your routine medicines may need to be changed to protect your baby.     Use acetaminophen (Tylenol) to relieve minor problems, such as a mild headache or backache or a mild fever with cold symptoms. Do not use nonsteroidal anti-inflammatory drugs (NSAIDs), such as ibuprofen (Advil, Motrin) or naproxen (Aleve), unless your doctor says it is okay.     Do not take two or more pain medicines at the same time unless the doctor told you to. Many pain medicines have acetaminophen, which is Tylenol. Too much acetaminophen (Tylenol) can be harmful.     Take your medicines exactly as prescribed. Call your doctor if you think you are having a problem with your medicine.   To manage morning sickness    Keep food in your stomach, but not too much at once. Try eating five or six small meals a day instead of three large meals.     For nausea when you wake up, eat a small snack, such as a couple of crackers or pretzels, before rising. Allow a few minutes for your stomach to settle before you slowly get up.     Try to avoid smells and foods that make you feel nauseated. High-fat or greasy foods, milk, and coffee may make nausea worse. Some foods that may be easier to tolerate include cold, spicy, sour, and salty foods.     Drink enough fluids. Water and other caffeine-free drinks are good choices.     Take your prenatal vitamins at night on a full stomach.     Try foods and drinks made with lashawn. Lashawn may help with nausea.     Get lots of rest. Morning sickness may be worse when you are tired.     Talk to your doctor about over-the-counter products, such as vitamin B6 or doxylamine, to help relieve symptoms.     Try a P6 acupressure wrist band. These anti-nausea wristbands help some people.   Follow-up care is a key part of your treatment and safety. Be sure to make and go to all appointments, and call your doctor if you are  "having problems. It's also a good idea to know your test results and keep a list of the medicines you take.  Where can you learn more?  Go to https://www.Fingo.net/patiented  Enter E868 in the search box to learn more about \"Learning About Pregnancy.\"  Current as of: April 30, 2024  Content Version: 14.5    0881-5115 [employer].   Care instructions adapted under license by your healthcare professional. If you have questions about a medical condition or this instruction, always ask your healthcare professional. [employer] disclaims any warranty or liability for your use of this information.    Weeks 6 to 10 of Your Pregnancy: Care Instructions  During these weeks of pregnancy, your body goes through many changes. You may start to feel different, both in your body and your emotions. Each pregnancy is different, so there's no \"right\" way to feel. These early weeks are a time to make healthy choices for you and your pregnancy.    Take a daily prenatal vitamin. Choose one with folic acid in it.   Avoid alcohol, tobacco, and drugs (including marijuana). If you need help quitting, talk to your doctor.     Drink plenty of liquids.  Be sure to drink enough water. And limit sodas, other sweetened drinks, and caffeine.     Choose foods that are good sources of calcium, iron, and folate.  You can try dairy products, dark leafy greens, fortified orange juice and cereals, almonds, broccoli, dried fruit, and beans.     Avoid foods that may be harmful.  Don't eat raw meat, deli meat, raw seafood, or raw eggs. Avoid soft cheese and unpasteurized dairy, like Brie and blue cheese. And don't eat fish that contains a lot of mercury, like shark and swordfish.     Don't touch romario litter or cat poop.  They can cause an infection that could be harmful during pregnancy.     Avoid things that can make your body too hot.  For example, avoid hot tubs and saunas.     Soothe morning sickness.  Try eating 5 or 6 " "small meals a day, getting some fresh air, or using leonora to control symptoms.     Ask your doctor about flu and COVID-19 shots.  Getting them can help protect against infection.   Follow-up care is a key part of your treatment and safety. Be sure to make and go to all appointments, and call your doctor if you are having problems. It's also a good idea to know your test results and keep a list of the medicines you take.  Where can you learn more?  Go to https://www.[employer].net/patiented  Enter G112 in the search box to learn more about \"Weeks 6 to 10 of Your Pregnancy: Care Instructions.\"  Current as of: April 30, 2024  Content Version: 14.5 2024-2025 [employer].   Care instructions adapted under license by your healthcare professional. If you have questions about a medical condition or this instruction, always ask your healthcare professional. [employer] disclaims any warranty or liability for your use of this information.       Pregnancy: Managing Morning Sickness (01:48)  Your health professional recommends that you watch this short online health video.  Learn how to manage morning sickness during pregnancy.   Purpose: Learn how to manage morning sickness during pregnancy.  Goal: Learn how to manage morning sickness during pregnancy.    Watch: Scan the QR code or visit the link to view video       https://hwi.se/[email or web address]  Current as of: April 30, 2024  Content Version: 14.5 2024-2025 [employer].   Care instructions adapted under license by your healthcare professional. If you have questions about a medical condition or this instruction, always ask your healthcare professional. [employer] disclaims any warranty or liability for your use of this information.    Pregnancy and Heartburn: Care Instructions  Overview     Heartburn is a common problem during pregnancy.  Heartburn happens when stomach acid backs up into the tube that carries food to the " stomach. This tube is called the esophagus. Early in pregnancy, heartburn is caused by hormone changes that slow down digestion. Later on, it's also caused by the large uterus pushing up on the stomach.  Even though you can't fix the cause, there are things you can do to get relief. Treating heartburn during pregnancy focuses first on making lifestyle changes, like changing what and how you eat, and on taking medicines.  Heartburn usually improves or goes away after childbirth.  Follow-up care is a key part of your treatment and safety. Be sure to make and go to all appointments, and call your doctor if you are having problems. It's also a good idea to know your test results and keep a list of the medicines you take.  How can you care for yourself at home?  Eat small, frequent meals.  Avoid foods that make your symptoms worse, such as chocolate, peppermint, and spicy foods. Avoid drinks with caffeine, such as coffee, tea, and sodas.  Avoid bending over or lying down after meals.  Take a short walk after you eat.  If heartburn is a problem at night, do not eat for 2 hours before bedtime.  Take antacids like Mylanta, Maalox, Rolaids, or Tums. Do not take antacids that have sodium bicarbonate, magnesium trisilicate, or aspirin. Be careful when you take over-the-counter antacid medicines. Many of these medicines have aspirin in them. While you are pregnant, do not take aspirin or medicines that contain aspirin unless your doctor says it is okay.  If you're not getting relief, talk to your doctor. You may be able to take a stronger acid-reducing medicine.  When should you call for help?   Call your doctor now or seek immediate medical care if:    You have new or worse belly pain.     You are vomiting.   Watch closely for changes in your health, and be sure to contact your doctor if:    You have new or worse symptoms of reflux.     You are losing weight.     You have trouble or pain swallowing.     You do not get better  "as expected.   Where can you learn more?  Go to https://www.Chrends.net/patiented  Enter U946 in the search box to learn more about \"Pregnancy and Heartburn: Care Instructions.\"  Current as of: April 30, 2024  Content Version: 14.5    [id number] [employer].   Care instructions adapted under license by your healthcare professional. If you have questions about a medical condition or this instruction, always ask your healthcare professional. [employer] disclaims any warranty or liability for your use of this information.    Constipation: Care Instructions  Overview     Constipation means that you have a hard time passing stools (bowel movements). People pass stools from 3 times a day to once every 3 days. What is normal for you may be different. Constipation may occur with pain in the rectum and cramping. The pain may get worse when you try to pass stools. Sometimes there are small amounts of bright red blood on toilet paper or the surface of stools. This is because of enlarged veins near the rectum (hemorrhoids).  A few changes in your diet and lifestyle may help you avoid ongoing constipation. Your doctor may also prescribe medicine to help loosen your stool.  Some medicines can cause constipation. These include pain medicines and antidepressants. Tell your doctor about all the medicines you take. Your doctor may want to make a medicine change to ease your symptoms.  Follow-up care is a key part of your treatment and safety. Be sure to make and go to all appointments, and call your doctor if you are having problems. It's also a good idea to know your test results and keep a list of the medicines you take.  How can you care for yourself at home?  Drink plenty of fluids. If you have kidney, heart, or liver disease and have to limit fluids, talk with your doctor before you increase the amount of fluids you drink.  Include high-fiber foods in your diet each day. These include fruits, vegetables, " "beans, and whole grains.  Get at least 30 minutes of exercise on most days of the week. Walking is a good choice. You also may want to do other activities, such as running, swimming, cycling, or playing tennis or team sports.  Take a fiber supplement, such as Citrucel or Metamucil, every day. Read and follow all instructions on the label.  Schedule time each day for a bowel movement. A daily routine may help. Take your time having a bowel movement, but don't sit for more than 10 minutes at a time. And don't strain too much.  Support your feet with a small step stool when you sit on the toilet. This helps flex your hips and places your pelvis in a squatting position.  Your doctor may recommend an over-the-counter laxative to relieve your constipation. Examples are Milk of Magnesia and MiraLax. Read and follow all instructions on the label. Do not use laxatives on a long-term basis.  When should you call for help?   Call your doctor now or seek immediate medical care if:    You have new or worse belly pain.     You have new or worse nausea or vomiting.     You have blood in your stools.   Watch closely for changes in your health, and be sure to contact your doctor if:    Your constipation is getting worse.     You do not get better as expected.   Where can you learn more?  Go to https://www.[employer].net/patiented  Enter P343 in the search box to learn more about \"Constipation: Care Instructions.\"  Current as of: October 19, 2024  Content Version: 14.5    9886-1744 [employer].   Care instructions adapted under license by your healthcare professional. If you have questions about a medical condition or this instruction, always ask your healthcare professional. [employer] disclaims any warranty or liability for your use of this information.    Learning About High-Iron Foods  What foods are high in iron?     The foods you eat contain nutrients, such as vitamins and minerals. Iron is a nutrient. " "Your body needs the right amount to stay healthy and work as it should. You can use the list below to help you make choices about which foods to eat.  Here are some foods that contain iron. They have 1 to 2 milligrams of iron per serving.  Fruits  Figs (dried), 5 figs  Vegetables  Asparagus (canned), 6 gallardo  Laura, beet, Swiss chard, or turnip greens, 1 cup  Dried peas, cooked,   cup  Seaweed, spirulina (dried),   cup  Spinach, (cooked)   cup or (raw) 1 cup  Grains  Cereals, fortified with iron, 1 cup  Grits (instant, cooked), fortified with iron,   cup  Meats and other protein foods  Beans (kidney, lima, navy, white), canned or cooked,   cup  Beef or lamb, 3 oz  Chicken giblets, 3 oz  Chickpeas (garbanzo beans),   cup  Liver of beef, lamb, or pork, 3 oz  Oysters (cooked), 3 oz  Sardines (canned), 3 oz  Soybeans (boiled),   cup  Tofu (firm),   cup  Work with your doctor to find out how much of this nutrient you need. Depending on your health, you may need more or less of it in your diet.  Where can you learn more?  Go to https://www.[employer]Miner.net/patiented  Enter R005 in the search box to learn more about \"Learning About High-Iron Foods.\"  Current as of: October 7, 2024  Content Version: 14.5 2024-2025 [employer].   Care instructions adapted under license by your healthcare professional. If you have questions about a medical condition or this instruction, always ask your healthcare professional. [employer] disclaims any warranty or liability for your use of this information.    Rh Antibodies Screening During Pregnancy: About This Test  What is it?     The Rh antibodies screening test is a blood test. It checks your blood for Rh antibodies. If you have Rh-negative blood and have been exposed to Rh-positive blood, your immune system may make antibodies to attack the Rh-positive blood. When a pregnant woman has these antibodies, it is called Rh sensitization.  Why is this test done?  The " "Rh antibodies screening test is done during pregnancy to find out if your baby is at risk for Rh disease. This can happen if you have Rh-negative blood and your baby has Rh-positive blood. If your Rh-negative blood mixes with Rh-positive blood, your immune system will make antibodies to attack the Rh-positive blood.  During pregnancy, these antibodies could attach to the baby's red blood cells. This can cause your baby to have serious health problems. The results of this test will help your doctor know how to best care for you and your baby during your pregnancy.  How do you prepare for the test?  In general, there's nothing you have to do before this test, unless your doctor tells you to.  How is the test done?  A health professional uses a needle to take a blood sample, usually from the arm.  What happens after the test?  You will probably be able to go home right away. It depends on the reason for the test.  You can go back to your usual activities right away.  Follow-up care is a key part of your treatment and safety. Be sure to make and go to all appointments, and call your doctor if you are having problems. It's also a good idea to keep a list of the medicines you take. Ask your doctor when you can expect to have your test results.  Where can you learn more?  Go to https://www.[employer].net/patiented  Enter P722 in the search box to learn more about \"Rh Antibodies Screening During Pregnancy: About This Test.\"  Current as of: April 30, 2024  Content Version: 14.5    1295-8251 [employer].   Care instructions adapted under license by your healthcare professional. If you have questions about a medical condition or this instruction, always ask your healthcare professional. [employer] disclaims any warranty or liability for your use of this information.    Learning About Preventing Rh Disease  What is Rh disease?    Rh disease can be a serious problem in pregnancy. It happens when substances " called antibodies in the mother's blood cause red blood cells in her baby's blood to be destroyed. This can occur when the blood types of a mother and her baby do not match.  All blood has an Rh factor. This is what makes a blood type positive or negative. When you are Rh-negative, your baby may be Rh-negative or Rh-positive. If your baby has Rh-positive blood and it mixes with yours, your body will make antibodies. This is called Rh sensitization.  Most of the time, this is not a problem in a first pregnancy. But in future pregnancies, it could cause Rh disease.  A  with Rh disease has mild anemia and may have jaundice. In severe cases, anemia, jaundice, and swelling can be very dangerous or fatal. Some babies need to be delivered early. Some need special care in the NICU. A very sick baby will need a blood transfusion before or after birth.  Fortunately, Rh sensitization is usually easy to prevent.  That's why it's important to get your Rh status checked in your first trimester. It doesn't cause any warning signs. A blood test is the only way to know if you are Rh-sensitive or are at risk for it.  How can you prevent Rh disease?  If you are Rh-negative, an Rh immune globulin shot (such as RhoGAM) can help prevent your body from making the antibodies that attack your baby's red blood cells.  Timing is important. The shot is given at certain times during your pregnancy. And it is given anytime there is a chance that your baby's blood might mix with yours. That can happen with certain prenatal tests or when you have pregnancy bleeding, such as:  Right after any pregnancy loss, amniocentesis, or CVS testing.  After turning of a breech baby.  Before and maybe after childbirth. If you need the shot, it is given around week 28. If your  is Rh-positive, you will have another shot after birth.  Follow-up care is a key part of your treatment and safety. Be sure to make and go to all appointments, and call your  "doctor if you are having problems. It's also a good idea to know your test results and keep a list of the medicines you take.  Where can you learn more?  Go to https://www.[employer].net/patiented  Enter W177 in the search box to learn more about \"Learning About Preventing Rh Disease.\"  Current as of: April 30, 2024  Content Version: 14.5 2024-2025 [employer].   Care instructions adapted under license by your healthcare professional. If you have questions about a medical condition or this instruction, always ask your healthcare professional. [employer] disclaims any warranty or liability for your use of this information.    Learning About Rh Immunoglobulin Shots  Introduction    An Rh immunoglobulin shot is given during pregnancy to prevent Rh sensitization. Rh sensitization can happen if you have Rh-negative blood and your baby has Rh-positive blood. If the two types of blood mix, your body will make antibodies against your baby's blood. Most of the time, this is not a problem the first time you're pregnant. But it could cause problems in future pregnancies.  This shot keeps your body from making the antibodies. If you need the shot, it is given around 28 weeks of pregnancy. After the birth, your baby's blood is tested. If the blood is Rh positive, you will receive another shot. The shot may also be given if you have vaginal bleeding while you are pregnant or if you have a miscarriage. These shots protect future pregnancies.  Example  Rh immunoglobulin (HypRho-D, MICRhoGAM, and RhoGAM)  Possible side effects  Rare side effects may include:  Some mild pain where you got the shot.  A slight fever.  An allergic reaction.  You may have other side effects not listed here. Check the information that comes with your medicine.  What to know about taking this medicine  You may need more than one shot. You may need the shot again:  After amniocentesis, fetal blood sampling, or chorionic villus " "sampling tests.  If you have bleeding in your second or third trimester.  After turning of a breech baby.  After an injury to the belly while you are pregnant.  After a miscarriage or an .  Before or right after treatment for an ectopic or a partial molar pregnancy.  Tell your doctor if you have any allergies or have had a bad response to medicines in the past.  If you get this shot within 3 months of getting a live-virus vaccine, the vaccine may not work. Your doctor will tell you if you need more vaccine.  Check with your doctor or pharmacist before you use any other medicines. This includes over-the-counter medicines. Make sure your doctor knows all of the medicines, vitamins, herbs, and supplements you take. Taking some medicines at the same time can cause problems.  Where can you learn more?  Go to https://www.[employer]/patiented  Enter V615 in the search box to learn more about \"Learning About Rh Immunoglobulin Shots.\"  Current as of: 2024  Content Version: 14. [facility].   Care instructions adapted under license by your healthcare professional. If you have questions about a medical condition or this instruction, always ask your healthcare professional. [facility] disclaims any warranty or liability for your use of this information.    Rubella (Faroese Measles): Care Instructions  Overview  Rubella, also called Faroese measles or 3-day measles, is a disease caused by a virus. It spreads by coughs, sneezes, and close contact. Rubella usually is mild and does not cause long-term problems. But if you are pregnant and get it, you can give the disease to your unborn baby. This can cause serious birth defects.  While you have rubella, you may get a rash and a mild fever, and the lymph glands in your neck may swell. Older children often have a fever, eye pain, a sore throat, and body aches. You can relieve most symptoms with care at home. Avoid being around " others, especially pregnant people, until your rash has been gone for at least 4 days. People who have not had this disease before or have not had the vaccine have the greatest chance of getting the virus.  Follow-up care is a key part of your treatment and safety. Be sure to make and go to all appointments, and call your doctor if you are having problems. It's also a good idea to know your test results and keep a list of the medicines you take.  How can you care for yourself at home?  Drink plenty of fluids. If you have kidney, heart, or liver disease and have to limit fluids, talk with your doctor before you increase the amount of fluids you drink.  Get plenty of rest to help your body heal.  Take an over-the-counter pain medicine, such as acetaminophen (Tylenol), ibuprofen (Advil, Motrin), or naproxen (Aleve), to reduce fever and discomfort. Read and follow all instructions on the label. Do not give aspirin to anyone younger than 20. It has been linked to Reye syndrome, a serious illness.  Do not take two or more pain medicines at the same time unless the doctor told you to. Many pain medicines have acetaminophen, which is Tylenol. Too much acetaminophen (Tylenol) can be harmful.  Try not to scratch the rash. Put cold, wet cloths on the rash to reduce itching.  Do not smoke. Smoking can make your symptoms worse. If you need help quitting, talk to your doctor about stop-smoking programs and medicines. These can increase your chances of quitting for good.  Avoid contact with people who have never had rubella and who have not been immunized.  When should you call for help?   Call your doctor now or seek immediate medical care if:    You have a fever with a stiff neck or a severe headache.     You are sensitive to light or feel very sleepy or confused.   Watch closely for changes in your health, and be sure to contact your doctor if:    You do not get better as expected.   Where can you learn more?  Go to  "https://www.[employer].net/patiented  Enter B812 in the search box to learn more about \"Rubella (Bulgarian Measles): Care Instructions.\"  Current as of: 2024  Content Version: . [employer]   Care instructions adapted under license by your healthcare professional. If you have questions about a medical condition or this instruction, always ask your healthcare professional. [employer]. disclaims any warranty or liability for your use of this information.    Gonorrhea and Chlamydia: About These Tests  What is it?  These tests use a sample of urine or other body fluid to look for the bacteria that cause these sexually transmitted infections (STIs). The fluid sample can come from the cervix, vagina, rectum, throat, or eyes.  Why is this test done?  These tests may be done to:  Find out if symptoms are caused by gonorrhea or chlamydia.  Check people who are at high risk of being infected with gonorrhea or chlamydia.  Retest people several months after they have been treated for gonorrhea or chlamydia.  Check for infection in your  if you had a gonorrhea or chlamydia infection at the time of delivery.  How can you prepare for the test?  If you are going to have a urine test, do not urinate for at least 1 hour before the test.  If you think you may have chlamydia or gonorrhea, don't have sexual intercourse until you get your test results. And you may want to have tests for other STIs, such as HIV.  How is the test done?  For a direct sample, a swab is used to collect body fluid from the cervix, vagina, rectum, throat, or eyes. Your doctor may collect the sample. Or you may be given instructions on how to collect your own sample.  For a urine sample, you will collect the urine that comes out when you first start to urinate. Don't wipe the genital area clean before you urinate.  How long does the test take?  The test will take a few minutes.  What happens after the " "test?  You will be able to go home right away.  You can go back to your usual activities right away.  If you do have an infection, don't have sexual intercourse for 7 days after you start treatment. And your sex partner(s) should also be treated.  Follow-up care is a key part of your treatment and safety. Be sure to make and go to all appointments, and call your doctor if you are having problems. It's also a good idea to keep a list of the medicines you take. Ask your doctor when you can expect to have your test results.  Where can you learn more?  Go to https://www.[employer].net/patiented  Enter K976 in the search box to learn more about \"Gonorrhea and Chlamydia: About These Tests.\"  Current as of: April 30, 2024  Content Version: 14.5    7398-6165 [employer].   Care instructions adapted under license by your healthcare professional. If you have questions about a medical condition or this instruction, always ask your healthcare professional. [employer] disclaims any warranty or liability for your use of this information.    Trichomoniasis: About This Test  What is it?     This test uses a sample of urine or other body fluid to look for the tiny parasite that causes trichomoniasis (also called trich). The fluid sample can come from the vagina, cervix, or urethra. Your doctor may choose to use one or more of many available tests.  Why is it done?  A trich test may be done to:  Find out if symptoms are caused by trich.  Check people who are at high risk for being infected with trich.  Check after treatment to make sure that the infection is gone.  How do you prepare for the test?  If you are going to have a urine test, do not urinate for at least 1 hour before the test.  How is the test done?  For a direct sample, a swab is used to collect body fluid from the cervix, vagina, or urethra. Your doctor may collect the sample. Or you may be given instructions on how to collect your own sample.  For " a urine sample, you will collect the urine that comes out when you first start to urinate. Don't wipe the area clean before you urinate.  How long does the test take?  It will take a few minutes to collect a sample.  What happens after the test?  You can go home right away.  You can go back to your usual activities right away.  You may get the test results the same day or several days later. It depends on the test used.  If you do have an infection, don't have sexual intercourse for 7 days after you start treatment. Your sex partner or partners should also be treated.  Follow-up care is a key part of your treatment and safety. Be sure to make and go to all appointments, and call your doctor if you are having problems. Ask your doctor when you can expect to have your test results.  Current as of: April 30, 2024  Content Version: 14.5    3858-8976 [employer].   Care instructions adapted under license by your healthcare professional. If you have questions about a medical condition or this instruction, always ask your healthcare professional. [employer] disclaims any warranty or liability for your use of this information.    HIV Testing: Care Instructions  Overview  You can get tested for the human immunodeficiency virus (HIV). Most doctors use a blood test to check for HIV antibodies and antigens in your blood. It may also check for the genetic material (RNA) of HIV. Some tests use saliva to check for HIV antibodies. But these aren't as accurate. For example, they may give a false result if you've just been infected.  What do the results mean?        Normal (negative)   No HIV antibodies, antigens, or RNA were found.  You may need more testing. It can make sure your test results are correct.        Uncertain (indeterminate)   Test results didn't clearly show if you have an HIV infection.  HIV antibodies or antigens may not have formed yet.  Some other type of antibody or antigen may have  "affected the results.  You will need another test to be sure.        Abnormal (positive)   HIV antibodies, antigens, or RNA were found.  If you haven't had an RNA test yet, one will be done. If it's positive, you have HIV.  If your test result is positive, your doctor will talk to you. You will discuss starting treatment.  Follow-up care is a key part of your treatment and safety. Be sure to make and go to all appointments, and call your doctor if you are having problems. It's also a good idea to know your test results and keep a list of the medicines you take.  Where can you learn more?  Go to https://www.[employer].net/patiented  Enter T792 in the search box to learn more about \"HIV Testing: Care Instructions.\"  Current as of: April 30, 2024  Content Version: 14.5    3419-1906 [employer].   Care instructions adapted under license by your healthcare professional. If you have questions about a medical condition or this instruction, always ask your healthcare professional. [employer] disclaims any warranty or liability for your use of this information.    Hepatitis C Virus Tests: About These Tests  What are they?     Hepatitis C virus tests are blood tests that check for substances in the blood that show whether you have hepatitis C now or had it in the past. The tests can also tell you what type of hepatitis C you have and how severe the disease is. This can help your doctor with treatment.  If the tests show that you have long-term hepatitis C, you need to take steps to prevent spreading the disease.  Why are these tests done?  You may need these tests if:  You have symptoms of hepatitis.  You may have been exposed to the virus. You are more likely to have been exposed to the virus if you inject drugs or are exposed to body fluids (such as if you are a health care worker).  You've had other tests that show you have liver problems.  You are 18 to 79 years old.  You have an HIV infection.  The " "tests also are done to help your doctor decide about your treatment and see how well it works.  How do you prepare for the test?  In general, there's nothing you have to do before this test, unless your doctor tells you to.  How is the test done?  A health professional uses a needle to take a blood sample, usually from the arm.  What happens after these tests?  You will probably be able to go home right away.  You can go back to your usual activities right away.  Follow-up care is a key part of your treatment and safety. Be sure to make and go to all appointments, and call your doctor if you are having problems. It's also a good idea to keep a list of the medicines you take. Ask your doctor when you can expect to have your test results.  Where can you learn more?  Go to https://www.[email or web address]/patiented  Enter W551 in the search box to learn more about \"Hepatitis C Virus Tests: About These Tests.\"  Current as of: April 30, 2024  Content Version: 14.5    9973-9817 [employer].   Care instructions adapted under license by your healthcare professional. If you have questions about a medical condition or this instruction, always ask your healthcare professional. [employer] disclaims any warranty or liability for your use of this information.    Learning About Fetal Ultrasound Results  What is a fetal ultrasound?     Fetal ultrasound is a test that lets your doctor see an image of your baby. Your doctor learns information about your baby from this picture. You may find out, for example, if you are having a boy or a girl. But the main reason you have this test is to get information about your baby's health.  (You may hear your baby called a fetus. This is a common medical term for a baby that's growing in the mother's uterus.)  What kind of information can you learn from this test?  The findings of an ultrasound fall into two categories, normal and abnormal.  Normal  The fetus is the right size " for its age.  The placenta is the expected size and does not cover the cervix.  There is enough amniotic fluid in the uterus.  No birth defects can be seen.  Abnormal  The fetus is small or large for its age.  The placenta covers the cervix.  There is too much or too little amniotic fluid in the uterus.  The fetus may have a birth defect.  What does an abnormal result mean?  Abnormal seems to imply that something is wrong with your baby. But what it means is that the test has shown something the doctor wants to take a closer look at.  And that's what happens next. Your doctor will talk to you about what further test or tests you may need.  What do the results mean?  Some of the things your doctor may see on an abnormal ultrasound include:  Echogenic bowel.  The bowel looks very bright on the screen. This could mean that there's blood in the bowel. Or it could mean that something is blocking the small bowel.  Increased nuchal translucency.  The ultrasound measures the thickness at the back of the baby's neck. An increase in thickness is sometimes an early sign of Down syndrome.  Increased or decreased amniotic fluid.  The doctor will look for a reason for the level of amniotic fluid and will watch the pregnancy closely as it progresses.  Large ventricles.  Ventricles in the brain look larger than they should. Your doctor may take a closer look at the brain.  Renal pyelectasis/hydronephrosis.  The ultrasound measures the fluid around the kidney. If there is more fluid than expected, there is a chance of urinary tract or kidney problems.  Short long bones.  The ultrasound measures certain arm and leg bones. A long bone (humerus or femur) that is shorter than average could be a sign of Down syndrome.  Subchorionic hemorrhage.  An ultrasound can show bleeding under one of the membranes that surrounds the fetus. Some women don't have symptoms of bleeding. The ultrasound can find this problem when women are not bleeding  "from their vagina. Women who have this condition have a slightly higher chance of miscarriage.  What do you do now?  Take a deep breath, and let it out. Keep in mind that an abnormal finding on an ultrasound, after it's coupled with more information, may:  Turn out to be nothing.  Turn out to be something mild that won't affect the baby.  Turn out to be something more serious. But if this happens, early diagnosis helps you and your doctor plan treatment options sooner rather than later.  Your medical team is there for you. So are your family and friends. Ask questions, and get the help and support you need.  Follow-up care is a key part of your treatment and safety. Be sure to make and go to all appointments, and call your doctor if you are having problems. It's also a good idea to know your test results and keep a list of the medicines you take.  Where can you learn more?  Go to https://www.[employer].net/patiented  Enter K451 in the search box to learn more about \"Learning About Fetal Ultrasound Results.\"  Current as of: April 30, 2024  Content Version: 14.5    3524-0204 [employer].   Care instructions adapted under license by your healthcare professional. If you have questions about a medical condition or this instruction, always ask your healthcare professional. [employer] disclaims any warranty or liability for your use of this information.    Learning About Prenatal Visits  Overview     Regular prenatal visits are very important during any pregnancy. These quick office visits may seem simple and routine. But they can help you have a safe and healthy pregnancy. Your doctor is watching for problems that can only be found through regular checkups. The visits also give you and your doctor time to build a good relationship.  After your first visit, you will most likely start on a schedule of monthly visits. In your third trimester, the visits will get more frequent. Based on your health, " your age, and if you've had a normal, full-term pregnancy before, your doctor may want to see you more or less often.  At different times in your pregnancy, you will have exams and tests. Some are routine. Others are done only when there is a chance of a problem. Everything healthy you do for your body helps you have a healthy pregnancy. Rest when you need it. Eat well, drink plenty of water, and exercise regularly.  What happens during a prenatal visit?  You will have blood pressure checks, along with urine tests. You also may have blood tests. If you need to go to the bathroom while waiting for the doctor, tell the nurse. You will be given a sample cup so your urine can be tested.  You will be weighed and have your belly measured.  Your doctor may listen to the fetal heartbeat with a special device.  At about 24 weeks, and possibly earlier in your pregnancy, your doctor will check your blood sugar (glucose tolerance test) for diabetes that can occur during pregnancy. This is gestational diabetes, which can be harmful.  You will have tests to check for infections that could harm your . These include group B streptococcus and hepatitis B.  Your doctor may do ultrasounds to check for problems. This also checks the position of the fetus. An ultrasound uses sound waves to produce a picture of the fetus.  You may get your vaccines updated.  Your doctor may ask you questions to check for signs of anxiety or depression. Tell your doctor if you feel sad, anxious, or hopeless for more than a few days.  You may have other tests at any time during your pregnancy.  Use your visits to discuss with your doctor any concerns you have.  How can you care for yourself at home?  Get plenty of rest.  Try to exercise every day, if your doctor says it is okay. If you have not exercised in the past, start out slowly. For example, you can take short walks each day.  Choose healthy foods, such as fruits, vegetables, whole grains,  "lean proteins, low-fat dairy, and healthy fats.  Drink plenty of fluids. Cut down on drinks with caffeine, such as coffee, tea, and cola. If you have kidney, heart, or liver disease and have to limit fluids, talk with your doctor before you increase the amount of fluids you drink.  Try to avoid chemical fumes, paint fumes, and poisons.  If you smoke, vape, or use alcohol, marijuana, or other drugs, quit or cut back as much as you can. Talk to your doctor if you need help quitting.  Review all of your medicines, including over-the-counter medicines and supplements, with your doctor. Some of your routine medicines may need to be changed. Do not stop or start taking any medicines without talking to your doctor first.  Follow-up care is a key part of your treatment and safety. Be sure to make and go to all appointments, and call your doctor if you are having problems. It's also a good idea to know your test results and keep a list of the medicines you take.  Where can you learn more?  Go to https://www.[employer].net/patiented  Enter J502 in the search box to learn more about \"Learning About Prenatal Visits.\"  Current as of: April 30, 2024  Content Version: 14.5 2024-2025 [employer].   Care instructions adapted under license by your healthcare professional. If you have questions about a medical condition or this instruction, always ask your healthcare professional. [employer] disclaims any warranty or liability for your use of this information.    Intimate Partner Violence: Care Instructions  Overview     If you want to save this information but don't think it is safe to take it home, see if a trusted friend can keep it for you. Plan ahead. Know who you can call for help, and memorize the phone number.   Be careful online too. Your online activity may be seen by others. Do not use your personal computer or device to read about this topic. Use a safe computer, such as one at work, a friend's " home, or a library.    Intimate partner violence--a type of domestic abuse--is different from an argument now and then. It is a pattern of abuse that one person may use to control another person's behavior. It may start with threats and name-calling. Then, it may lead to more serious acts, like pushing and slapping. The abuse also may occur in other areas. For example, the abuser may withhold money or spend a partner's money without their knowledge.  Abuse can cause serious harm. You are more likely to have a long-term health problem from the injuries and stress of living in a violent relationship. People who are sexually abused by their partners have more sexually transmitted infections and unplanned pregnancies. Anyone who is abused also faces emotional pain. Anyone can be abused in relationships. In some relationships, both people use abusive behavior.  If you are pregnant, abuse can cause problems such as poor weight gain, infections, and bleeding. Abuse during this time may increase your baby's risk of low birth weight, premature birth, and death.  Follow-up care is a key part of your treatment and safety. Be sure to make and go to all appointments, and call your doctor if you are having problems. It's also a good idea to know your test results and keep a list of the medicines you take.  How can you care for yourself at home?  If you do not have a safe place to stay, discuss this with your doctor before you leave.  Have a plan for where to go, how to leave your home, and where to stay in case of an emergency. Do not tell your partner about your plan. Contact:  The National Domestic Violence Hotline toll-free at 1-736.900.2045. They can help you find resources in your area.  Your local police department, hospital, or clinic for information about shelters and safe homes near you.  Talk to a trusted friend or neighbor, a counselor, or a karla leader. Do not feel that you have to hide what happened.  Teach your  "children how to call for help in an emergency.  Be alert to warning signs, such as threats, heavy alcohol use, or drug use. This can help you avoid danger.  If you can, make sure that there are no guns or other weapons in your home.  When should you call for help?   Call 911 anytime you think you may need emergency care. For example, call if:    You or someone else has just been abused.     You think you or someone else is in danger of being abused.   Watch closely for changes in your health, and be sure to contact your doctor if you have any problems.  Where can you learn more?  Go to https://www.Accedo.net/patiented  Enter G282 in the search box to learn more about \"Intimate Partner Violence: Care Instructions.\"  Current as of: July 31, 2024  Content Version: 14.5    3212-3325 [employer].   Care instructions adapted under license by your healthcare professional. If you have questions about a medical condition or this instruction, always ask your healthcare professional. [employer] disclaims any warranty or liability for your use of this information.    Intimate Partner Violence Safety Instructions: Care Instructions  Overview     If you want to save this information but don't think it is safe to take it home, see if a trusted friend can keep it for you. Plan ahead. Know who you can call for help, and memorize the phone number.   Be careful online too. Your online activity may be seen by others. Do not use your personal computer or device to read about this topic. Use a safe computer, such as one at work, a friend's home, or a library.    When you are abused by a spouse or partner, you can take actions to protect yourself and your children.  You can increase your safety whether you decide to stay with your spouse or partner or you decide to leave. You may want to make a safety plan and pack a bag ahead of time. This will help you leave quickly when you decide to. Remember, you cannot " change your partner's actions, but you can find help for you and your children. No one deserves to be abused.  Follow-up care is a key part of your treatment and safety. Be sure to make and go to all appointments, and call your doctor if you are having problems. It's also a good idea to know your test results and keep a list of the medicines you take.  How can you care for yourself at home?  Make a plan for your safety   If you decide to stay with your abusive spouse or partner, you can do the following to increase your safety:  Decide what works best to keep you safe in an emergency.  Know who you can call to help you in an emergency.  Decide if you will call the police if you get hurt again. If you can, agree on a signal with your children or neighbor to call the police for you if you need help. You can flash lights or hang something out of a window.  Choose a safe place to go for a short time if you need to leave home. Memorize the address and phone number.  Learn escape routes out of your home in case you need to leave in a hurry. Teach your children different ways to get out of your home quickly if they need to.  If you can, hide or lock up things that can be used as weapons (guns, knives, hammers).  Learn the number of a domestic violence shelter. Talk to the people there about how they can help.  Find out about other community resources that can help you.  Take pictures of bruises or other injuries if you can. You can also take pictures of things your abuser has broken.  Teach your children that violence is never okay. Tell them that they do not deserve to be hurt.  Pack a bag   Prepare a bag with things you will need if you leave suddenly. Leave it with a friend, a relative, or someone else you trust. You could include the following items in the bag:  A set of keys to your home and car.  Emergency phone numbers and addresses.  Money such as cash or checks. You can also ask a friend, a relative, or someone  "else you trust to hold money for you.  Copies of legal documents such as house and car titles or rent receipts, birth certificates, Social Security card, voter registration, marriage and 's licenses, and your children's health records.  Personal items you would need for a few days, such as clothes, a toothbrush, toothpaste, and any medicines you or your children need.  A favorite toy or book for your child or children.  Diapers and bottles, if you have very young children.  Pictures that show signs of abuse and violence. You may also add pictures of your abuser.  If you leave   If you decide to leave, you can take the following steps:  Go to the emergency room at a hospital if you have been hurt.  Think about asking the police to be with you as you leave. They can protect you as you leave your home.  If you decide to leave secretly, remember that activities can be tracked. Your abuser may still have access to your cell phone, email, and credit cards. It may be possible for these to be traced. Always be aware of your surroundings.  If this is an emergency, do not worry about gathering up anything. Just leave--your safety is most important.  If your abuser moves out, change the locks on the doors. If you have a security system, change the access code.  When should you call for help?   Call 911 anytime you think you may need emergency care. For example, call if:    You or someone else has just been abused.     You think you or someone else is in danger of being abused.   Watch closely for changes in your health, and be sure to contact your doctor if you have any problems.  Where can you learn more?  Go to https://www.SmartMove.net/patiented  Enter A752 in the search box to learn more about \"Intimate Partner Violence Safety Instructions: Care Instructions.\"  Current as of: July 31, 2024  Content Version: 14.5    3739-4026 [facility].   Care instructions adapted under license by your [facility] " professional. If you have questions about a medical condition or this instruction, always ask your healthcare professional. [employer] disclaims any warranty or liability for your use of this information.    Learning About Intimate Partner Violence  What is intimate partner violence?  Intimate partner violence is a type of domestic abuse. It's threatening, emotionally harmful, or violent behavior in a personal relationship. It can happen between past or current partners or spouses. In some relationships both people abuse each other. One partner may be more abusive. Or the abuse may be equal.  Abuse can affect people of any ethnic group, race, or Oriental orthodox. It can affect teens, adults, or the elderly. And it can happen to people of any sexual orientation, gender, or social status.  Abusers use fear, bullying, and threats to control their partners. They may control what their partners do. They may control where their partners go or who they see. They may act jealous, controlling, or possessive. These early signs of abuse may happen soon after the start of the relationship. Sometimes it can be hard to notice abuse at first. But after the relationship becomes more serious, the abuse may get worse.  If you are being abused in your relationship, it's important to get help. The abuse is not your fault. You don't have to face it alone.  Be careful  It may not be safe to take home domestic abuse information like this handout. Some people ask a trusted friend to keep it for them. It's also important to plan ahead and to memorize the phone number of places you can go for help. If you are concerned about your safety, do not use your computer, smartphone, or tablet to read about domestic abuse.   What are the types of intimate partner violence?  Abuse can happen in different ways. Each type can happen on its own or in combination with others.  Emotional abuse  Emotional abuse is a pattern of threats, insults, or  controlling behavior. It includes verbal abuse. It goes beyond healthy disagreements in a relationship. It's a sign of an unhealthy relationship.  Do you feel threatened, intimidated, or controlled?  Does your partner:  Threaten your children, other family members, or pets?  Use jokes meant to embarrass or shame you?  Call you names?  Tell you that you are a bad parent?  Threaten to take away your children?  Threaten to have you or your family members deported?  Control your access to money or other basic needs?  Control what you do, who you see or talk to, or where you go?  Another form of emotional abuse is denying that it is happening. Or the abuser may act like the abuse is no big deal or is your fault.  Sexual abuse  With sexual abuse, abusers may try to convince or force you to have sex. They may force you into sex acts you're not comfortable with. Or they may sexually assault you. Sexual abuse can happen even if you are in a committed relationship.  Physical abuse  Physical abuse means that a partner hits, kicks, or does something else to physically hurt you. Physical abuse that starts with a slap might lead to kicking, shoving, and choking over time. The abuser may also threaten to hurt or kill you.  Stalking  Stalking means that an abuser gives you attention that you do not want and that causes you fear. Examples of stalking include:  Following you.  Showing up at places where the abuser isn't invited, such as at your work or school.  Constantly calling or texting you.  What problems can  to?  Intimate partner violence can be very dangerous. It can cause serious, repeated injury. It can even lead to death.  All forms of abuse can cause long-term health problems from the stress of a violent relationship. Verbal abuse can lead to sexual and physical abuse.  Abuse causes:  Emotional pain.  Depression.  Anxiety.  Post-traumatic stress.  Sexual abuse can lead to sexually transmitted infections (such as  "HIV/AIDS) and unplanned pregnancy.  Pregnancy can be a very dangerous time for people in abusive relationships. Abuse can cause or increase the risk of problems during pregnancy. These include low weight gain, anemia, infections, and bleeding. Abuse may also increase your baby's risk of low birth weight, premature birth, and death.  It can be hard for some victims of abuse to ask for help or to leave their relationship. You may feel scared, stuck, or not sure what steps to take. But it's important not to ignore abuse. Talking to someone you trust could be the first step to ending the abuse and taking care of your own health and happiness again. There are resources available that can help keep you safe.  Where can you get help?  Talk to a trusted friend. Find a local advocacy group, or talk to your doctor about the abuse.  Contact the National Domestic Violence Hotline at 8-172-939-ZBDT (1-145.160.7184) for more safety tips. They can guide you to groups in your area that can help. Or go to the National Coalition Against Domestic Violence website at www.thehotline.org to learn more.  Domestic violence groups or a counselor in your area can help you make a safety plan for yourself and your children.  When to call for help  Call 911 anytime you think you may need emergency care. For example, call if:  You think that you or someone you know is in danger of being abused.  You have been hurt and can't have someone safely take you to emergency care.  You have just been abused.  A family member has just been abused.  Where can you learn more?  Go to https://www.healthQRGL.net/patiented  Enter S665 in the search box to learn more about \"Learning About Intimate Partner Violence.\"  Current as of: July 31, 2024  Content Version: 14.5    5408-4196 [employer].   Care instructions adapted under license by your healthcare professional. If you have questions about a medical condition or this instruction, always ask your " healthcare professional. [employer] disclaims any warranty or liability for your use of this information.    Vaginal Bleeding During Pregnancy: Care Instructions  Overview     It's common to have some vaginal spotting when you are pregnant. In some cases, the bleeding isn't serious. And there aren't any more problems with the pregnancy.  But sometimes bleeding is a sign of a more serious problem. This is more common if the bleeding is heavy or painful. Examples of more serious problems include miscarriage, an ectopic pregnancy, and a problem with the placenta.  You may have to see your doctor again to be sure everything is okay. You may also need more tests to find the cause of the bleeding.  Home treatment may be all you need. But it depends on what is causing the bleeding. Be sure to tell your doctor if you have any new symptoms or if your symptoms get worse.  The doctor has checked you carefully, but problems can develop later. If you notice any problems or new symptoms, get medical treatment right away.  Follow-up care is a key part of your treatment and safety. Be sure to make and go to all appointments, and call your doctor if you are having problems. It's also a good idea to know your test results and keep a list of the medicines you take.  How can you care for yourself at home?  If your doctor prescribed medicines, take them exactly as directed. Call your doctor if you think you are having a problem with your medicine.  Do not have vaginal sex until your doctor says it's okay.  Do not put anything in your vagina until your doctor says it's okay.  Ask your doctor about other activities you can or can't do.  Get a lot of rest. Being pregnant can make you tired.  Do not use nonsteroidal anti-inflammatory drugs (NSAIDs), such as ibuprofen (Advil, Motrin), naproxen (Aleve), or aspirin, unless your doctor says it is okay.  When should you call for help?   Call 911 anytime you think you may need  emergency care. For example, call if:    You passed out (lost consciousness).     You have severe vaginal bleeding. This means you are soaking through a pad each hour for 2 or more hours.     You have sudden, severe pain in your belly or pelvis.   Call your doctor now or seek immediate medical care if:    You have new or worse vaginal bleeding.     You are dizzy or lightheaded, or you feel like you may faint.     You have pain in your belly, pelvis, or lower back.     You think that you are in labor.     You have a sudden release of fluid from your vagina.     You've been having regular contractions for an hour. This means that you've had at least 8 contractions within 1 hour or at least 4 contractions within 20 minutes, even after you change your position and drink fluids.     You notice that your baby has stopped moving or is moving much less than normal.   Watch closely for changes in your health, and be sure to contact your doctor if you have any problems.  Current as of: April 30, 2024  Content Version: 14.5    0693-9032 [employer].   Care instructions adapted under license by your healthcare professional. If you have questions about a medical condition or this instruction, always ask your healthcare professional. [employer] disclaims any warranty or liability for your use of this information.